# Patient Record
Sex: MALE | Race: BLACK OR AFRICAN AMERICAN | NOT HISPANIC OR LATINO | Employment: FULL TIME | URBAN - NONMETROPOLITAN AREA
[De-identification: names, ages, dates, MRNs, and addresses within clinical notes are randomized per-mention and may not be internally consistent; named-entity substitution may affect disease eponyms.]

---

## 2023-06-09 RX ORDER — METFORMIN HYDROCHLORIDE 1000 MG/1
TABLET ORAL
Qty: 60 TABLET | Refills: 1 | OUTPATIENT
Start: 2023-06-09

## 2023-08-04 DIAGNOSIS — M1A.9XX0 CHRONIC GOUT WITHOUT TOPHUS, UNSPECIFIED CAUSE, UNSPECIFIED SITE: ICD-10-CM

## 2023-08-04 RX ORDER — INDOMETHACIN 50 MG/1
50 CAPSULE ORAL 3 TIMES DAILY
COMMUNITY
End: 2023-08-04 | Stop reason: SDUPTHER

## 2023-08-07 RX ORDER — INDOMETHACIN 50 MG/1
50 CAPSULE ORAL 3 TIMES DAILY
Qty: 30 CAPSULE | Refills: 0 | Status: SHIPPED | OUTPATIENT
Start: 2023-08-07 | End: 2023-08-17

## 2023-11-10 ENCOUNTER — HOSPITAL ENCOUNTER (EMERGENCY)
Facility: HOSPITAL | Age: 53
Discharge: HOME | End: 2023-11-10
Payer: MEDICARE

## 2023-11-10 VITALS
HEART RATE: 87 BPM | WEIGHT: 315 LBS | TEMPERATURE: 99.2 F | HEIGHT: 72 IN | OXYGEN SATURATION: 97 % | BODY MASS INDEX: 42.66 KG/M2 | SYSTOLIC BLOOD PRESSURE: 169 MMHG | DIASTOLIC BLOOD PRESSURE: 122 MMHG | RESPIRATION RATE: 17 BRPM

## 2023-11-10 DIAGNOSIS — M10.9 GOUT OF MULTIPLE SITES, UNSPECIFIED CAUSE, UNSPECIFIED CHRONICITY: Primary | ICD-10-CM

## 2023-11-10 PROCEDURE — 99283 EMERGENCY DEPT VISIT LOW MDM: CPT

## 2023-11-10 PROCEDURE — 2500000001 HC RX 250 WO HCPCS SELF ADMINISTERED DRUGS (ALT 637 FOR MEDICARE OP): Performed by: PHYSICIAN ASSISTANT

## 2023-11-10 PROCEDURE — 99285 EMERGENCY DEPT VISIT HI MDM: CPT

## 2023-11-10 RX ORDER — INDOMETHACIN 25 MG/1
25 CAPSULE ORAL ONCE
Status: COMPLETED | OUTPATIENT
Start: 2023-11-10 | End: 2023-11-10

## 2023-11-10 RX ORDER — INDOMETHACIN 25 MG/1
25 CAPSULE ORAL 3 TIMES DAILY PRN
Qty: 30 CAPSULE | Refills: 0 | Status: SHIPPED | OUTPATIENT
Start: 2023-11-10 | End: 2023-11-20

## 2023-11-10 RX ADMIN — INDOMETHACIN 25 MG: 25 CAPSULE ORAL at 21:19

## 2023-11-10 ASSESSMENT — COLUMBIA-SUICIDE SEVERITY RATING SCALE - C-SSRS
2. HAVE YOU ACTUALLY HAD ANY THOUGHTS OF KILLING YOURSELF?: NO
1. IN THE PAST MONTH, HAVE YOU WISHED YOU WERE DEAD OR WISHED YOU COULD GO TO SLEEP AND NOT WAKE UP?: NO
6. HAVE YOU EVER DONE ANYTHING, STARTED TO DO ANYTHING, OR PREPARED TO DO ANYTHING TO END YOUR LIFE?: NO

## 2023-11-10 ASSESSMENT — PAIN SCALES - GENERAL: PAINLEVEL_OUTOF10: 8

## 2023-11-10 ASSESSMENT — PAIN - FUNCTIONAL ASSESSMENT: PAIN_FUNCTIONAL_ASSESSMENT: 0-10

## 2023-11-10 ASSESSMENT — PAIN DESCRIPTION - PAIN TYPE: TYPE: ACUTE PAIN

## 2023-11-10 ASSESSMENT — PAIN DESCRIPTION - LOCATION: LOCATION: WRIST

## 2023-11-11 NOTE — ED PROVIDER NOTES
HPI   Chief Complaint   Patient presents with    Joint Swelling     States is having a gout flare up in his left wrist. Requesting a medication called indomethacin        History of present illness:  53-year-old male presents to the emergency room for complaints of left wrist pain.  He states he has been having worsening pain in his left wrist for the past few days and he woke up with the pain a few mornings ago.  He states that he has a history of gout which she has had been dealing with for the past 15 to 20 years.  He states that he does not currently take allopurinol and stopped taking it years ago after was proving ineffective for preventing flare.  He states usually gets indomethacin over he has a flareup and states usually happens a few times a year.  He states that he has never been seen by rheumatology before but he does see his primary care doctor.  He states he takes medications for hypertension and hyperlipidemia.  He denies taking any thiazides or water pills and denies taking other medications.    Social history: Negative for alcohol and drug use.    Review of systems:   Gen.: No weight loss,  fever.   Eyes: No vision loss, double vision  ENT: No pharyngitis, neck pain  Cardiac: No chest pain, palpitations, syncope  Pulmonary: No shortness of breath, cough, hemoptysis.   Heme/lymph: No swollen glands, fever, bleeding.   GI: No abdominal pain, change in bowel habits, melena, hematemesis, hematochezia, nausea, vomiting, diarrhea.   : No discharge, dysuria, frequency, urgency, hematuria.   Musculoskeletal: No limb pain  Skin: No rashes.   Review of systems is otherwise negative unless stated above or in history of present illness.        Physical exam:  General: Vitals noted, no distress. Afebrile.   EENT: No lymphadenopathy   Cardiac: Regular, rate, rhythm, no murmur.   Pulmonary: Lungs clear bilaterally with good aeration. No adventitious breath sounds.   Abdomen: Soft, nonsurgical. Nontender. No  peritoneal signs. Normoactive bowel sounds.   Extremities: No peripheral edema.  The left wrist is with swelling is present over the dorsal aspect of the wrist extending onto the proximal portion of the hand, is warm and tender to touch there is full range of motion intact good cap refill and sensation all fingertips.  The right wrist is unremarkable  Skin: No rash.   Neuro: No focal neurologic deficits          Medical decision making:   Testing: Clinical exam  Treatment: Indomethacin p.o. given  Reevaluation:   Plan: Home-going.  Discussed differential. Will follow-up with rheumatology in the next 2-3 days. Return if worse. They understand return precautions and discharge instructions. Patient and family/friend/caregiver are in agreement with this plan. 53-year-old male presents to the emergency room for complaints of left wrist pain.  He states he has been having worsening pain in his left wrist for the past few days and he woke up with the pain a few mornings ago.  He states that he has a history of gout which she has had been dealing with for the past 15 to 20 years.  He states that he does not currently take allopurinol and stopped taking it years ago after was proving ineffective for preventing flare.  He states usually gets indomethacin over he has a flareup and states usually happens a few times a year.  He states that he has never been seen by rheumatology before but he does see his primary care doctor.  He states he takes medications for hypertension and hyperlipidemia.  He denies taking any thiazides or water pills and denies taking other medications. Extremities: No peripheral edema.  The left wrist is with swelling is present over the dorsal aspect of the wrist extending onto the proximal portion of the hand, is warm and tender to touch there is full range of motion intact good cap refill and sensation all fingertips.  The right wrist is unremarkable.  I explained to the patient be happy to send her  home with some the medicine at this time and he was agreeable this plan.  I encouraged him to follow-up with rheumatology as well given his history of recurrent gout flareups.    Impression:   1.  Gout            History provided by:  Patient   used: No                        Mila Coma Scale Score: 15                  Patient History   Past Medical History:   Diagnosis Date    Ganglion, unspecified site 09/24/2015    Ganglion cyst    Gout, unspecified 12/09/2013    Acute gout    Personal history of other diseases of the circulatory system 07/17/2017    History of uncontrolled hypertension    Personal history of other endocrine, nutritional and metabolic disease 07/21/2014    History of hyperglycemia     History reviewed. No pertinent surgical history.  No family history on file.  Social History     Tobacco Use    Smoking status: Never    Smokeless tobacco: Never   Vaping Use    Vaping Use: Never used   Substance Use Topics    Alcohol use: Never    Drug use: Never       Physical Exam   ED Triage Vitals [11/10/23 2024]   Temp Heart Rate Resp BP   37.3 °C (99.2 °F) 87 17 (!) 169/122      SpO2 Temp Source Heart Rate Source Patient Position   97 % Temporal Monitor --      BP Location FiO2 (%)     -- --       Physical Exam    ED Course & MDM   Diagnoses as of 11/10/23 2120   Gout of multiple sites, unspecified cause, unspecified chronicity       Medical Decision Making      Procedure  Procedures     Jann Plummer PA-C  11/10/23 2205

## 2023-12-05 ENCOUNTER — OFFICE VISIT (OUTPATIENT)
Dept: PRIMARY CARE | Facility: CLINIC | Age: 53
End: 2023-12-05
Payer: MEDICARE

## 2023-12-05 VITALS
HEART RATE: 93 BPM | DIASTOLIC BLOOD PRESSURE: 83 MMHG | WEIGHT: 315 LBS | HEIGHT: 72 IN | SYSTOLIC BLOOD PRESSURE: 130 MMHG | OXYGEN SATURATION: 93 % | BODY MASS INDEX: 42.66 KG/M2

## 2023-12-05 DIAGNOSIS — N18.32 TYPE 2 DIABETES MELLITUS WITH STAGE 3B CHRONIC KIDNEY DISEASE, WITHOUT LONG-TERM CURRENT USE OF INSULIN (MULTI): ICD-10-CM

## 2023-12-05 DIAGNOSIS — R10.32 GROIN PAIN, LEFT: ICD-10-CM

## 2023-12-05 DIAGNOSIS — M1A.09X0 CHRONIC GOUT OF MULTIPLE SITES, UNSPECIFIED CAUSE: Primary | ICD-10-CM

## 2023-12-05 DIAGNOSIS — I10 UNCONTROLLED HYPERTENSION: ICD-10-CM

## 2023-12-05 DIAGNOSIS — E11.22 TYPE 2 DIABETES MELLITUS WITH STAGE 3B CHRONIC KIDNEY DISEASE, WITHOUT LONG-TERM CURRENT USE OF INSULIN (MULTI): ICD-10-CM

## 2023-12-05 DIAGNOSIS — Z91.199 NON-COMPLIANCE: ICD-10-CM

## 2023-12-05 PROBLEM — E11.9 DIABETES MELLITUS (MULTI): Status: ACTIVE | Noted: 2023-12-05

## 2023-12-05 PROBLEM — R79.89 LOW VITAMIN D LEVEL: Status: ACTIVE | Noted: 2023-12-05

## 2023-12-05 PROBLEM — M1A.9XX0 CHRONIC GOUTY ARTHROPATHY: Status: ACTIVE | Noted: 2023-12-05

## 2023-12-05 PROBLEM — R06.83 SNORES: Status: ACTIVE | Noted: 2023-12-05

## 2023-12-05 PROBLEM — M1A.00X0 CHRONIC GOUTY ARTHROPATHY: Status: ACTIVE | Noted: 2023-12-05

## 2023-12-05 PROBLEM — G47.33 SEVERE OBSTRUCTIVE SLEEP APNEA: Status: ACTIVE | Noted: 2023-12-05

## 2023-12-05 PROBLEM — R80.9 PROTEINURIA: Status: ACTIVE | Noted: 2023-12-05

## 2023-12-05 PROBLEM — M79.18 MUSCULOSKELETAL PAIN: Status: ACTIVE | Noted: 2023-12-05

## 2023-12-05 PROBLEM — M10.9 GOUT: Status: ACTIVE | Noted: 2023-12-05

## 2023-12-05 LAB — POC HEMOGLOBIN A1C: 6 % (ref 4.2–6.5)

## 2023-12-05 PROCEDURE — 3079F DIAST BP 80-89 MM HG: CPT

## 2023-12-05 PROCEDURE — 99213 OFFICE O/P EST LOW 20 MIN: CPT

## 2023-12-05 PROCEDURE — 4010F ACE/ARB THERAPY RXD/TAKEN: CPT

## 2023-12-05 PROCEDURE — 3075F SYST BP GE 130 - 139MM HG: CPT

## 2023-12-05 PROCEDURE — 1036F TOBACCO NON-USER: CPT

## 2023-12-05 PROCEDURE — 83036 HEMOGLOBIN GLYCOSYLATED A1C: CPT

## 2023-12-05 RX ORDER — AMLODIPINE BESYLATE 10 MG/1
1 TABLET ORAL DAILY
COMMUNITY
Start: 2019-03-27 | End: 2023-12-05 | Stop reason: SDUPTHER

## 2023-12-05 RX ORDER — METHYLPREDNISOLONE 4 MG/1
TABLET ORAL
Qty: 21 TABLET | Refills: 0 | Status: SHIPPED | OUTPATIENT
Start: 2023-12-05 | End: 2023-12-12

## 2023-12-05 RX ORDER — INDOMETHACIN 50 MG/1
50 CAPSULE ORAL
Qty: 60 CAPSULE | Refills: 0 | Status: SHIPPED | OUTPATIENT
Start: 2023-12-05 | End: 2024-01-04

## 2023-12-05 RX ORDER — AMLODIPINE BESYLATE 10 MG/1
10 TABLET ORAL DAILY
Qty: 90 TABLET | Refills: 3 | Status: SHIPPED | OUTPATIENT
Start: 2023-12-05 | End: 2024-12-04

## 2023-12-05 RX ORDER — LOSARTAN POTASSIUM 100 MG/1
1 TABLET ORAL DAILY
COMMUNITY
Start: 2021-10-07 | End: 2023-12-05 | Stop reason: SDUPTHER

## 2023-12-05 RX ORDER — LOSARTAN POTASSIUM 100 MG/1
100 TABLET ORAL DAILY
Qty: 90 TABLET | Refills: 3 | Status: SHIPPED | OUTPATIENT
Start: 2023-12-05 | End: 2024-12-04

## 2023-12-05 ASSESSMENT — ENCOUNTER SYMPTOMS
FATIGUE: 0
NECK PAIN: 0
ACTIVITY CHANGE: 0
DIFFICULTY URINATING: 0
WEAKNESS: 1
NAUSEA: 0
NECK STIFFNESS: 0
RECTAL PAIN: 0
FREQUENCY: 0
HYPERTENSION: 1
CONSTIPATION: 0
ABDOMINAL DISTENTION: 0
VOMITING: 0
APPETITE CHANGE: 0
FEVER: 0
DIARRHEA: 0
UNEXPECTED WEIGHT CHANGE: 0
PAIN: 1
MYALGIAS: 1
FLANK PAIN: 0
PALPITATIONS: 0
ABDOMINAL PAIN: 0

## 2023-12-05 ASSESSMENT — PATIENT HEALTH QUESTIONNAIRE - PHQ9
SUM OF ALL RESPONSES TO PHQ9 QUESTIONS 1 AND 2: 0
2. FEELING DOWN, DEPRESSED OR HOPELESS: NOT AT ALL
1. LITTLE INTEREST OR PLEASURE IN DOING THINGS: NOT AT ALL

## 2023-12-05 NOTE — PROGRESS NOTES
Subjective   Patient ID: Gabriel Davis is a 53 y.o. male who presents for Gout (Problems with gout and flaring up, injury in left lower abdominal pain.).    Gout, pain right big toe, left hip, bilateral hands, similar to flares in the past. Has tried allopurinol     Diabetes  He presents for his follow-up diabetic visit. He has type 2 diabetes mellitus. No MedicAlert identification noted. The initial diagnosis of diabetes was made 8 years ago. His disease course has been fluctuating. There are no hypoglycemic associated symptoms. Associated symptoms include foot paresthesias and weakness. Pertinent negatives for diabetes include no fatigue. There are no hypoglycemic complications. Risk factors for coronary artery disease include obesity, male sex, stress and diabetes mellitus. When asked about current treatments, none were reported.   Pain  This is a new problem. The current episode started more than 1 month ago. The problem occurs daily. The problem is unchanged. The pain occurs in the context of an injury (was squatting at home trying to exercise). Pain location: left groin, radiates to hip and knee. The pain is severe. The symptoms are aggravated by any movement. Associated symptoms include weakness. Pertinent negatives include no abdominal pain, constipation, diarrhea, fatigue, fever, nausea or vomiting. (Gout flare  ) Past treatments include rest and heat pack. The treatment provided no relief.   Hypertension  This is a chronic problem. The current episode started more than 1 year ago. The problem has been gradually improving since onset. The problem is uncontrolled. Associated symptoms include malaise/fatigue and peripheral edema. Pertinent negatives include no neck pain or palpitations. There are no associated agents to hypertension. Risk factors for coronary artery disease include diabetes mellitus, obesity and male gender. Past treatments include lifestyle changes, calcium channel blockers and angiotensin  blockers. The current treatment provides significant improvement. Compliance problems include exercise and diet.         Review of Systems   Constitutional:  Positive for malaise/fatigue. Negative for activity change, appetite change, fatigue, fever and unexpected weight change.   Cardiovascular:  Negative for palpitations.   Gastrointestinal:  Negative for abdominal distention, abdominal pain, constipation, diarrhea, nausea, rectal pain and vomiting.   Genitourinary:  Negative for difficulty urinating, flank pain, frequency, scrotal swelling and testicular pain.   Musculoskeletal:  Positive for gait problem and myalgias. Negative for neck pain and neck stiffness.   Neurological:  Positive for weakness.       Objective   /83   Pulse 93   Ht 1.829 m (6')   Wt (!) 150 kg (331 lb)   SpO2 93%   BMI 44.89 kg/m²     Physical Exam  Vitals and nursing note reviewed.   Constitutional:       General: He is not in acute distress.     Appearance: Normal appearance. He is obese. He is not ill-appearing.   Cardiovascular:      Heart sounds: Normal heart sounds.   Pulmonary:      Effort: Pulmonary effort is normal.      Breath sounds: Normal breath sounds.   Abdominal:      General: Bowel sounds are normal.      Tenderness: There is no guarding or rebound.   Neurological:      Mental Status: He is alert.         Assessment/Plan   Problem List Items Addressed This Visit             ICD-10-CM    Diabetes mellitus (CMS/[facility]) E11.9    Relevant Orders    POCT glycosylated hemoglobin (Hb A1C) manually resulted (Completed)    Gout - Primary M10.9    Relevant Medications    indomethacin (Indocin) 50 mg capsule    methylPREDNISolone (Medrol Dospak) 4 mg tablets    Non-compliance Z91.199    Uncontrolled hypertension I10    Relevant Medications    losartan (Cozaar) 100 mg tablet    amLODIPine (Norvasc) 10 mg tablet     Other Visit Diagnoses         Codes    Groin pain, left     R10.32    Relevant Orders    US pelvis    Referral to  Physical Therapy

## 2023-12-11 ENCOUNTER — ANCILLARY PROCEDURE (OUTPATIENT)
Dept: RADIOLOGY | Facility: CLINIC | Age: 53
End: 2023-12-11
Payer: MEDICARE

## 2023-12-11 DIAGNOSIS — R10.32 GROIN PAIN, LEFT: ICD-10-CM

## 2023-12-11 PROCEDURE — 76857 US EXAM PELVIC LIMITED: CPT

## 2023-12-11 PROCEDURE — 76857 US EXAM PELVIC LIMITED: CPT | Performed by: RADIOLOGY

## 2024-01-26 DIAGNOSIS — M1A.9XX0 CHRONIC GOUT WITHOUT TOPHUS, UNSPECIFIED CAUSE, UNSPECIFIED SITE: ICD-10-CM

## 2024-01-26 RX ORDER — INDOMETHACIN 50 MG/1
50 CAPSULE ORAL 3 TIMES DAILY
COMMUNITY
Start: 2023-10-12 | End: 2024-01-26 | Stop reason: SDUPTHER

## 2024-01-28 DIAGNOSIS — M1A.09X0 CHRONIC GOUT OF MULTIPLE SITES, UNSPECIFIED CAUSE: ICD-10-CM

## 2024-01-28 DIAGNOSIS — E66.01 CLASS 3 SEVERE OBESITY DUE TO EXCESS CALORIES WITH SERIOUS COMORBIDITY AND BODY MASS INDEX (BMI) OF 40.0 TO 44.9 IN ADULT (MULTI): ICD-10-CM

## 2024-01-28 DIAGNOSIS — R79.89 LOW VITAMIN D LEVEL: ICD-10-CM

## 2024-01-28 DIAGNOSIS — E11.22 TYPE 2 DIABETES MELLITUS WITH STAGE 3B CHRONIC KIDNEY DISEASE, WITHOUT LONG-TERM CURRENT USE OF INSULIN (MULTI): ICD-10-CM

## 2024-01-28 DIAGNOSIS — I10 UNCONTROLLED HYPERTENSION: ICD-10-CM

## 2024-01-28 DIAGNOSIS — N18.32 TYPE 2 DIABETES MELLITUS WITH STAGE 3B CHRONIC KIDNEY DISEASE, WITHOUT LONG-TERM CURRENT USE OF INSULIN (MULTI): ICD-10-CM

## 2024-01-28 DIAGNOSIS — N18.30 STAGE 3 CHRONIC KIDNEY DISEASE, UNSPECIFIED WHETHER STAGE 3A OR 3B CKD (MULTI): Primary | ICD-10-CM

## 2024-01-30 RX ORDER — INDOMETHACIN 50 MG/1
50 CAPSULE ORAL
Qty: 270 CAPSULE | Refills: 3 | Status: SHIPPED | OUTPATIENT
Start: 2024-01-30

## 2024-02-05 ENCOUNTER — OFFICE VISIT (OUTPATIENT)
Dept: PRIMARY CARE | Facility: CLINIC | Age: 54
End: 2024-02-05
Payer: MEDICARE

## 2024-02-05 ENCOUNTER — LAB (OUTPATIENT)
Dept: LAB | Facility: LAB | Age: 54
End: 2024-02-05
Payer: MEDICARE

## 2024-02-05 VITALS
DIASTOLIC BLOOD PRESSURE: 118 MMHG | BODY MASS INDEX: 42.66 KG/M2 | OXYGEN SATURATION: 98 % | WEIGHT: 315 LBS | SYSTOLIC BLOOD PRESSURE: 214 MMHG | HEART RATE: 75 BPM | HEIGHT: 72 IN

## 2024-02-05 DIAGNOSIS — G47.33 SEVERE OBSTRUCTIVE SLEEP APNEA: ICD-10-CM

## 2024-02-05 DIAGNOSIS — N18.30 STAGE 3 CHRONIC KIDNEY DISEASE, UNSPECIFIED WHETHER STAGE 3A OR 3B CKD (MULTI): ICD-10-CM

## 2024-02-05 DIAGNOSIS — G89.29 CHRONIC GROIN PAIN, LEFT: ICD-10-CM

## 2024-02-05 DIAGNOSIS — I10 UNCONTROLLED HYPERTENSION: ICD-10-CM

## 2024-02-05 DIAGNOSIS — R79.89 LOW VITAMIN D LEVEL: ICD-10-CM

## 2024-02-05 DIAGNOSIS — E11.22 TYPE 2 DIABETES MELLITUS WITH STAGE 3B CHRONIC KIDNEY DISEASE, WITHOUT LONG-TERM CURRENT USE OF INSULIN (MULTI): ICD-10-CM

## 2024-02-05 DIAGNOSIS — N18.32 TYPE 2 DIABETES MELLITUS WITH STAGE 3B CHRONIC KIDNEY DISEASE, WITHOUT LONG-TERM CURRENT USE OF INSULIN (MULTI): ICD-10-CM

## 2024-02-05 DIAGNOSIS — Z00.00 PREVENTATIVE HEALTH CARE: Primary | ICD-10-CM

## 2024-02-05 DIAGNOSIS — I16.0 ASYMPTOMATIC HYPERTENSIVE URGENCY: ICD-10-CM

## 2024-02-05 DIAGNOSIS — E11.9 NON-INSULIN DEPENDENT TYPE 2 DIABETES MELLITUS (MULTI): ICD-10-CM

## 2024-02-05 DIAGNOSIS — M79.605 LEFT LEG PAIN: ICD-10-CM

## 2024-02-05 DIAGNOSIS — M1A.00X0 CHRONIC GOUTY ARTHROPATHY: ICD-10-CM

## 2024-02-05 DIAGNOSIS — M1A.09X0 CHRONIC GOUT OF MULTIPLE SITES, UNSPECIFIED CAUSE: ICD-10-CM

## 2024-02-05 DIAGNOSIS — R10.32 CHRONIC GROIN PAIN, LEFT: ICD-10-CM

## 2024-02-05 DIAGNOSIS — M79.89 SWELLING OF LEFT LOWER EXTREMITY: ICD-10-CM

## 2024-02-05 LAB
25(OH)D3 SERPL-MCNC: 38 NG/ML (ref 30–100)
ALBUMIN SERPL BCP-MCNC: 4.1 G/DL (ref 3.4–5)
ALP SERPL-CCNC: 85 U/L (ref 33–120)
ALT SERPL W P-5'-P-CCNC: 9 U/L (ref 10–52)
ANION GAP SERPL CALC-SCNC: 18 MMOL/L (ref 10–20)
AST SERPL W P-5'-P-CCNC: 11 U/L (ref 9–39)
BASOPHILS # BLD AUTO: 0.04 X10*3/UL (ref 0–0.1)
BASOPHILS NFR BLD AUTO: 0.7 %
BILIRUB SERPL-MCNC: 0.6 MG/DL (ref 0–1.2)
BUN SERPL-MCNC: 18 MG/DL (ref 6–23)
CALCIUM SERPL-MCNC: 9.4 MG/DL (ref 8.6–10.3)
CHLORIDE SERPL-SCNC: 104 MMOL/L (ref 98–107)
CHOLEST SERPL-MCNC: 150 MG/DL (ref 0–199)
CHOLESTEROL/HDL RATIO: 3.9
CO2 SERPL-SCNC: 26 MMOL/L (ref 21–32)
CREAT SERPL-MCNC: 1.3 MG/DL (ref 0.5–1.3)
CREAT UR-MCNC: 87.4 MG/DL (ref 20–370)
EGFRCR SERPLBLD CKD-EPI 2021: 66 ML/MIN/1.73M*2
EOSINOPHIL # BLD AUTO: 0.1 X10*3/UL (ref 0–0.7)
EOSINOPHIL NFR BLD AUTO: 1.6 %
ERYTHROCYTE [DISTWIDTH] IN BLOOD BY AUTOMATED COUNT: 15.7 % (ref 11.5–14.5)
GLUCOSE SERPL-MCNC: 142 MG/DL (ref 74–99)
HCT VFR BLD AUTO: 39.4 % (ref 41–52)
HDLC SERPL-MCNC: 38.8 MG/DL
HGB BLD-MCNC: 12.1 G/DL (ref 13.5–17.5)
IMM GRANULOCYTES # BLD AUTO: 0.01 X10*3/UL (ref 0–0.7)
IMM GRANULOCYTES NFR BLD AUTO: 0.2 % (ref 0–0.9)
LDLC SERPL CALC-MCNC: 97 MG/DL
LYMPHOCYTES # BLD AUTO: 1.46 X10*3/UL (ref 1.2–4.8)
LYMPHOCYTES NFR BLD AUTO: 23.8 %
MCH RBC QN AUTO: 22 PG (ref 26–34)
MCHC RBC AUTO-ENTMCNC: 30.7 G/DL (ref 32–36)
MCV RBC AUTO: 72 FL (ref 80–100)
MICROALBUMIN UR-MCNC: 8.6 MG/L
MICROALBUMIN/CREAT UR: 9.8 UG/MG CREAT
MONOCYTES # BLD AUTO: 0.37 X10*3/UL (ref 0.1–1)
MONOCYTES NFR BLD AUTO: 6 %
NEUTROPHILS # BLD AUTO: 4.16 X10*3/UL (ref 1.2–7.7)
NEUTROPHILS NFR BLD AUTO: 67.7 %
NON HDL CHOLESTEROL: 111 MG/DL (ref 0–149)
NRBC BLD-RTO: 0 /100 WBCS (ref 0–0)
PLATELET # BLD AUTO: 231 X10*3/UL (ref 150–450)
POTASSIUM SERPL-SCNC: 4.2 MMOL/L (ref 3.5–5.3)
PROT SERPL-MCNC: 7.2 G/DL (ref 6.4–8.2)
RBC # BLD AUTO: 5.49 X10*6/UL (ref 4.5–5.9)
SODIUM SERPL-SCNC: 144 MMOL/L (ref 136–145)
TRIGL SERPL-MCNC: 73 MG/DL (ref 0–149)
TSH SERPL-ACNC: 3.65 MIU/L (ref 0.44–3.98)
URATE SERPL-MCNC: 8.5 MG/DL (ref 4–7.5)
VLDL: 15 MG/DL (ref 0–40)
WBC # BLD AUTO: 6.1 X10*3/UL (ref 4.4–11.3)

## 2024-02-05 PROCEDURE — 82043 UR ALBUMIN QUANTITATIVE: CPT

## 2024-02-05 PROCEDURE — 36415 COLL VENOUS BLD VENIPUNCTURE: CPT

## 2024-02-05 PROCEDURE — 99214 OFFICE O/P EST MOD 30 MIN: CPT | Performed by: FAMILY MEDICINE

## 2024-02-05 PROCEDURE — 1036F TOBACCO NON-USER: CPT | Performed by: FAMILY MEDICINE

## 2024-02-05 PROCEDURE — 82570 ASSAY OF URINE CREATININE: CPT

## 2024-02-05 PROCEDURE — 3080F DIAST BP >= 90 MM HG: CPT | Performed by: FAMILY MEDICINE

## 2024-02-05 PROCEDURE — 84443 ASSAY THYROID STIM HORMONE: CPT

## 2024-02-05 PROCEDURE — 84550 ASSAY OF BLOOD/URIC ACID: CPT

## 2024-02-05 PROCEDURE — 3077F SYST BP >= 140 MM HG: CPT | Performed by: FAMILY MEDICINE

## 2024-02-05 PROCEDURE — 99396 PREV VISIT EST AGE 40-64: CPT | Performed by: FAMILY MEDICINE

## 2024-02-05 PROCEDURE — 82306 VITAMIN D 25 HYDROXY: CPT

## 2024-02-05 PROCEDURE — 85025 COMPLETE CBC W/AUTO DIFF WBC: CPT

## 2024-02-05 PROCEDURE — 80053 COMPREHEN METABOLIC PANEL: CPT

## 2024-02-05 PROCEDURE — 3008F BODY MASS INDEX DOCD: CPT | Performed by: FAMILY MEDICINE

## 2024-02-05 PROCEDURE — 4010F ACE/ARB THERAPY RXD/TAKEN: CPT | Performed by: FAMILY MEDICINE

## 2024-02-05 PROCEDURE — 80061 LIPID PANEL: CPT

## 2024-02-05 RX ORDER — SPIRONOLACTONE 50 MG/1
50 TABLET, FILM COATED ORAL DAILY
Qty: 90 TABLET | Refills: 3 | Status: SHIPPED | OUTPATIENT
Start: 2024-02-05 | End: 2025-02-04

## 2024-02-05 ASSESSMENT — COLUMBIA-SUICIDE SEVERITY RATING SCALE - C-SSRS
6. HAVE YOU EVER DONE ANYTHING, STARTED TO DO ANYTHING, OR PREPARED TO DO ANYTHING TO END YOUR LIFE?: NO
1. IN THE PAST MONTH, HAVE YOU WISHED YOU WERE DEAD OR WISHED YOU COULD GO TO SLEEP AND NOT WAKE UP?: NO
2. HAVE YOU ACTUALLY HAD ANY THOUGHTS OF KILLING YOURSELF?: NO

## 2024-02-05 ASSESSMENT — PATIENT HEALTH QUESTIONNAIRE - PHQ9
1. LITTLE INTEREST OR PLEASURE IN DOING THINGS: NOT AT ALL
SUM OF ALL RESPONSES TO PHQ9 QUESTIONS 1 AND 2: 0
2. FEELING DOWN, DEPRESSED OR HOPELESS: NOT AT ALL

## 2024-02-05 ASSESSMENT — ENCOUNTER SYMPTOMS
OCCASIONAL FEELINGS OF UNSTEADINESS: 0
LOSS OF SENSATION IN FEET: 0
DEPRESSION: 0

## 2024-02-05 NOTE — PROGRESS NOTES
Subjective   Patient is a 53 y.o. male presents for yearly physical examination.     Previous medication list on 2/6/23: Ozeomic 0.5 mg weekly, Indomethacin 50 mg prn , amlodipine 10 mg , losartan 100 mg daily, spironolactone 25 mg daily, albuterol, night time O2  Current Meds: amlodipine 10 mg , indomethacin 50 mg , losartan 100 mg     #) COVID shot and feet are now hurting - stiffness      #) gout- last flare in the Fall 2023  - diuretic seem to trigger a flare   - clavile, wrist and ankle pains      #) T2DM - stable   - sugars have been low  - A1c 8.8% on Oct 7 2021--> 6.5% 4/11/22---> 5.7% today 2/6/23 --> 6.4% today   - was put on insulin in the hospital with covid- off insulin now  - on metformin, and glipizide--> started on ozempic  - weight down 7# , and another 33# --> another 23#     #) HTN - UNCONTROLLED   -- ho,e readings are around 150/80s  - 142/88 --> 132/80--> 148/80 --> 170/102--> 184/118 --> 214/118 today   - on amlodipine, lostartan  also on blood thinner prophylaxis, will run out in 2 weeks.      #) severe sleep apnea. - not on treatment   - referral to sleep med  - rec continued home O2 at night      wt down from 376 (2021)--> 309 --> 333    Review of system are negative unless otherwise stated in the HPI.     Objective     BP (!) 214/118   Pulse 75   Ht 1.829 m (6')   Wt (!) 151 kg (333 lb)   SpO2 98%   BMI 45.16 kg/m²     Physical Examination  GEN: A+O, no acute distress  HEENT: NC/AT, Oropharynx clear, no exudates, TM visualized, Extraoccular muscles intact, no facial droop; no thyromegaly or cervical LAD  RESP: CTAB, no wheezes   CV: RRR, no murmurs  ABD: soft, non-tender, + BS  SKIN: no rashes or bruising, no peripheral edema   NEURO: CN II-XII grossly intact, moves all extremities equally, no tremor   PSYCH: normal affect, appropriate mood     Assessment/Plan     Active Problems:  There are no active Hospital Problems.  Myrtle   Stoneedwin Davis is a 53 y.o. male who presents for  Annual Exam (Bp is high feeling under the weather, grown in hurting him).    HPI    ROS was completed and all systems are negative with the exception of what was noted in the the HPI.     Objective     BP (!) 214/118   Pulse 75   Ht 1.829 m (6')   Wt (!) 151 kg (333 lb)   SpO2 98%   BMI 45.16 kg/m²      Physical Exam  GEN: A+O, no acute distress, obese male  HEENT: NC/AT, Oropharynx clear, no exudates, TM visualized, Extraoccular muscles intact, no facial droop; no thyromegaly or cervical LAD  RESP: CTAB, no wheezes   CV: RRR, no murmurs  ABD: soft, non-tender, + BS  SKIN: no rashes or bruising, no peripheral edema   NEURO: CN II-XII grossly intact, moves all extremities equally, no tremor   PSYCH: normal affect, appropriate mood     Assessment/Plan   Problem List Items Addressed This Visit    None    Fasting labs are due now if you could go down today to get this done now.      CT of the chest in 1/17/24- showed some scarring ( likely from the bad covid infection), so some dilation of the arteries to the lung from untreated sleep apnea and the scarring of the lungs.     Order for a CT of the abdomen and pelvic to looks for abdominal restrictions / clots, hernias.     We should eventually get an ultrasound of the heart to check for the squeeze function.     Please try to reach out to patient assistance for the bills.     Referral to nurse practitioner Juana to help with treatment of the sleep apnea.      Blood pressure is WAY TOO HIGH today. Continue the amlodipine and losartan  ADD back in the spironolactone for swelling and blood pressure      Please continue to check blood pressure eat home, goal is less than 130/80     weight is back up 30#.      A1c today was 6.4%, goal is to keep it under 7%. This is up 0.4% since December 2023, please restart the ozempic, samples given today   Restart at the 0.25 mg WEEKLY skin shot.   Order for the      Please set up a consultation for new gout medications that might  help     please consider a podiatry consultation for diabetes. Also get a yearly dilated eye examination and monitor/check feet daily for injuries.      Referral to our clinical pharmacist to help with coverage and medication cost.     Please follow up in 1 months for A1c recheck , weight recheck and BP recheck.        Nia Noriega DO, MSMed, ABOM  [address]   [address]   [place] [address]  Ph. [phone]  Fx. [phone]

## 2024-02-05 NOTE — PATIENT INSTRUCTIONS
Fasting labs are due now if you could go down today to get this done now.      CT of the chest in 1/17/24- showed some scarring ( likely from the bad covid infection), so some dilation of the arteries to the lung from untreated sleep apnea and the scarring of the lungs.     Order for a CT of the abdomen and pelvic to looks for abdominal restrictions / clots, hernias.     We should eventually get an ultrasound of the heart to check for the squeeze function.     Please try to reach out to patient assistance for the bills.     Referral to nurse practitioner Juana to help with treatment of the sleep apnea.      Blood pressure is WAY TOO HIGH today. Continue the amlodipine and losartan  ADD back in the spironolactone for swelling and blood pressure      Please continue to check blood pressure eat home, goal is less than 130/80     weight is back up 30#.      A1c today was 6.4%, goal is to keep it under 7%. This is up 0.4% since December 2023, please restart the ozempic, samples given today   Restart at the 0.25 mg WEEKLY skin shot.   Order for the      Please set up a consultation for new gout medications that might help     please consider a podiatry consultation for diabetes. Also get a yearly dilated eye examination and monitor/check feet daily for injuries.      Referral to our clinical pharmacist to help with coverage and medication cost.     Please follow up in 1 months for A1c recheck , weight recheck and BP recheck.

## 2024-03-11 ENCOUNTER — APPOINTMENT (OUTPATIENT)
Dept: PRIMARY CARE | Facility: CLINIC | Age: 54
End: 2024-03-11
Payer: MEDICARE

## 2024-03-13 ENCOUNTER — APPOINTMENT (OUTPATIENT)
Dept: RADIOLOGY | Facility: HOSPITAL | Age: 54
End: 2024-03-13
Payer: MEDICARE

## 2024-03-26 ENCOUNTER — APPOINTMENT (OUTPATIENT)
Dept: RADIOLOGY | Facility: HOSPITAL | Age: 54
End: 2024-03-26
Payer: MEDICARE

## 2024-04-01 ENCOUNTER — OFFICE VISIT (OUTPATIENT)
Dept: PRIMARY CARE | Facility: CLINIC | Age: 54
End: 2024-04-01
Payer: MEDICARE

## 2024-04-01 VITALS
BODY MASS INDEX: 42.66 KG/M2 | HEIGHT: 72 IN | OXYGEN SATURATION: 96 % | HEART RATE: 92 BPM | SYSTOLIC BLOOD PRESSURE: 162 MMHG | DIASTOLIC BLOOD PRESSURE: 90 MMHG | WEIGHT: 315 LBS

## 2024-04-01 DIAGNOSIS — M79.89 SYMPTOM OF LEG SWELLING: ICD-10-CM

## 2024-04-01 DIAGNOSIS — N18.32 TYPE 2 DIABETES MELLITUS WITH STAGE 3B CHRONIC KIDNEY DISEASE, WITHOUT LONG-TERM CURRENT USE OF INSULIN (MULTI): ICD-10-CM

## 2024-04-01 DIAGNOSIS — E11.22 TYPE 2 DIABETES MELLITUS WITH STAGE 3B CHRONIC KIDNEY DISEASE, WITHOUT LONG-TERM CURRENT USE OF INSULIN (MULTI): ICD-10-CM

## 2024-04-01 DIAGNOSIS — R10.32 LEFT GROIN PAIN: Primary | ICD-10-CM

## 2024-04-01 DIAGNOSIS — I10 UNCONTROLLED HYPERTENSION: ICD-10-CM

## 2024-04-01 LAB — POC HEMOGLOBIN A1C: 6.3 % (ref 4.2–6.5)

## 2024-04-01 PROCEDURE — 3077F SYST BP >= 140 MM HG: CPT | Performed by: FAMILY MEDICINE

## 2024-04-01 PROCEDURE — 83036 HEMOGLOBIN GLYCOSYLATED A1C: CPT | Performed by: FAMILY MEDICINE

## 2024-04-01 PROCEDURE — 3008F BODY MASS INDEX DOCD: CPT | Performed by: FAMILY MEDICINE

## 2024-04-01 PROCEDURE — 1036F TOBACCO NON-USER: CPT | Performed by: FAMILY MEDICINE

## 2024-04-01 PROCEDURE — 3048F LDL-C <100 MG/DL: CPT | Performed by: FAMILY MEDICINE

## 2024-04-01 PROCEDURE — 3061F NEG MICROALBUMINURIA REV: CPT | Performed by: FAMILY MEDICINE

## 2024-04-01 PROCEDURE — 4010F ACE/ARB THERAPY RXD/TAKEN: CPT | Performed by: FAMILY MEDICINE

## 2024-04-01 PROCEDURE — 3080F DIAST BP >= 90 MM HG: CPT | Performed by: FAMILY MEDICINE

## 2024-04-01 PROCEDURE — 99214 OFFICE O/P EST MOD 30 MIN: CPT | Performed by: FAMILY MEDICINE

## 2024-04-01 RX ORDER — METOPROLOL SUCCINATE 50 MG/1
50 TABLET, EXTENDED RELEASE ORAL DAILY
Qty: 90 TABLET | Refills: 3 | Status: SHIPPED | OUTPATIENT
Start: 2024-04-01 | End: 2025-04-01

## 2024-04-01 ASSESSMENT — PATIENT HEALTH QUESTIONNAIRE - PHQ9
1. LITTLE INTEREST OR PLEASURE IN DOING THINGS: NOT AT ALL
2. FEELING DOWN, DEPRESSED OR HOPELESS: NOT AT ALL
SUM OF ALL RESPONSES TO PHQ9 QUESTIONS 1 AND 2: 0

## 2024-04-01 ASSESSMENT — ENCOUNTER SYMPTOMS
LOSS OF SENSATION IN FEET: 0
OCCASIONAL FEELINGS OF UNSTEADINESS: 0
DEPRESSION: 0

## 2024-04-01 ASSESSMENT — COLUMBIA-SUICIDE SEVERITY RATING SCALE - C-SSRS
1. IN THE PAST MONTH, HAVE YOU WISHED YOU WERE DEAD OR WISHED YOU COULD GO TO SLEEP AND NOT WAKE UP?: NO
2. HAVE YOU ACTUALLY HAD ANY THOUGHTS OF KILLING YOURSELF?: NO
6. HAVE YOU EVER DONE ANYTHING, STARTED TO DO ANYTHING, OR PREPARED TO DO ANYTHING TO END YOUR LIFE?: NO

## 2024-04-01 NOTE — PATIENT INSTRUCTIONS
Labs on 2/5/24 - We knew from the A1c in the office that the sugars have been higher than in the past, up to 6.4% from the 5.7% previously. Kidney function looks ok, normal liver function, Uric acid levels are still elevated at 8.5, this is putting you at high risk of a gout attack.  Blood counts look better than previous but still low hemoglobin , suspicious of low iron. Vitamin D has improved from 18 to 38, goal is to get this up to around 60 so please keep taking the vitamin D supplement. Cholesterol looks about the same, I am happy the LDL is less than 100 at 97.  Normal thyroid.      CT of the chest in 1/17/24- showed some scarring (likely from the bad covid infection), so some dilation of the arteries to the lung from untreated sleep apnea and the scarring of the lungs.     Order for a CT of the abdomen and pelvic to looks for abdominal restrictions / clots, hernias for the bilateral leg swelling - insurance denies first order for this.  We did a better job documenting this time, I still feel like we need this.     We should eventually get an ultrasound of the heart to check for the squeeze function.     Please try to reach out to patient assistance for the bills.     Referral to nurse practitioner Juana to help with treatment of the sleep apnea.      Blood pressure is WAY TOO HIGH today. Continue the amlodipine and losartan  START the metoproolol 50 mg daily   STOP the spironolactone cannon to nausea   Rechecked today was 172/95 with large cuff, still too small.      Please continue to check blood pressure eat home, goal is less than 130/80   Call in 1 WEEK with home readings.     weight is back up 30#. And up another 6#      A1c today was 6.3%, goal is to keep it under 7%. Please increase the ozempic, samples given today   Restart at the 0.25 mg WEEKLY skin shot.   Bring your machine with your to next appt.      Please set up a consultation for new gout medications that might help     please consider a  podiatry consultation for diabetes. Also get a yearly dilated eye examination and monitor/check feet daily for injuries.      Referral to our clinical pharmacist to help with coverage and medication cost.     Please follow up in 1 months for weight recheck and BP recheck.

## 2024-04-01 NOTE — PROGRESS NOTES
Subjective   Patient is a 53 y.o. male presents for yearly physical examination.     Previous medication list on 2/6/23: Ozeomic 0.5 mg weekly, Indomethacin 50 mg prn , amlodipine 10 mg , losartan 100 mg daily, spironolactone 25 mg daily, albuterol, night time O2  Current Meds: amlodipine 10 mg , indomethacin 50 mg , losartan 100 mg      #) gout- last flare in the Fall 2023  - diuretic seem to trigger a flare   - clavile, wrist and ankle pains      #) T2DM - stable   - sugars have been low  - A1c 8.8% on Oct 7 2021--> 6.5% 4/11/22---> 5.7%  2/6/23 --> 6.4% --> 6.0% 12/5/23   - was put on insulin in the hospital with covid- off insulin now  - on metformin, and glipizide--> started on ozempic  - weight down 7# , and another 33# --> another 23#,, then stopped taking it and regained      #) HTN - UNCONTROLLED   -- ho,e readings are around 150/80s  - 142/88 --> 132/80--> 148/80 --> 170/102--> 184/118 --> 214/118 --> 178/102 today   - on amlodipine, losartan  - started the spironolactone  - made him nauseated   also on blood thinner prophylaxis, will run out in 2 weeks.      #) severe sleep apnea. - not on treatment   - referral to sleep med  - rec continued home O2 at night     #) bilateral lege swelling and pain   - DVT was ruled out   - still having the left groin pain   - still very fatigued  - this is limited ability to stay active   - needs abdominal imaging to rule out obstructive      wt down from 376 (2021)--> 309 --> 333 --> 339       Review of system are negative unless otherwise stated in the HPI.     Objective     BP (!) 178/102   Pulse 92   Ht 1.829 m (6')   Wt (!) 154 kg (339 lb)   SpO2 96%   BMI 45.98 kg/m²     Physical Examination  GEN: A+O, no acute distress  HEENT: NC/AT, Oropharynx clear, no exudates, TM visualized, Extraoccular muscles intact, no facial droop; no thyromegaly or cervical LAD  RESP: CTAB, no wheezes   CV: RRR, no murmurs  ABD: soft, non-tender, + BS  SKIN: no rashes or bruising, no  peripheral edema   NEURO: CN II-XII grossly intact, moves all extremities equally, no tremor   PSYCH: normal affect, appropriate mood     Assessment/Plan     Active Problems:  There are no active Hospital Problems.  Myrtle Davis is a 53 y.o. male who presents for Follow-up (Bp follow up A1c check ).    HPI    ROS was completed and all systems are negative with the exception of what was noted in the the HPI.     Objective     BP (!) 178/102   Pulse 92   Ht 1.829 m (6')   Wt (!) 154 kg (339 lb)   SpO2 96%   BMI 45.98 kg/m²      Physical Exam  GEN: A+O, no acute distress, obese male  HEENT: NC/AT, Oropharynx clear, no exudates, TM visualized, Extraoccular muscles intact, no facial droop; no thyromegaly or cervical LAD  RESP: CTAB, no wheezes   CV: RRR, no murmurs  ABD: soft, non-tender, + BS  SKIN: no rashes or bruising, no peripheral edema   NEURO: CN II-XII grossly intact, moves all extremities equally, no tremor   PSYCH: normal affect, appropriate mood     Assessment/Plan   Problem List Items Addressed This Visit    None    \Labs on 2/5/24 - We knew from the A1c in the office that the sugars have been higher than in the past, up to 6.4% from the 5.7% previously. Kidney function looks ok, normal liver function, Uric acid levels are still elevated at 8.5, this is putting you at high risk of a gout attack.  Blood counts look better than previous but still low hemoglobin , suspicious of low iron. Vitamin D has improved from 18 to 38, goal is to get this up to around 60 so please keep taking the vitamin D supplement. Cholesterol looks about the same, I am happy the LDL is less than 100 at 97.  Normal thyroid.      CT of the chest in 1/17/24- showed some scarring (likely from the bad covid infection), so some dilation of the arteries to the lung from untreated sleep apnea and the scarring of the lungs.     Order for a CT of the abdomen and pelvic to looks for abdominal restrictions / clots, hernias for  the bilateral leg swelling - insurance denies first order for this.  We did a better job documenting this time, I still feel like we need this.     We should eventually get an ultrasound of the heart to check for the squeeze function.     Please try to reach out to patient assistance for the bills.     Referral to nurse practitioner Juana to help with treatment of the sleep apnea.      Blood pressure is WAY TOO HIGH today. Continue the amlodipine and losartan  START the metoproolol 50 mg daily   STOP the spironolactone cannon to nausea      Please continue to check blood pressure eat home, goal is less than 130/80   Call in 1 WEEK with home readings.     weight is back up 30#. And up another 6#      A1c today was 6.3%, goal is to keep it under 7%. Please increase the ozempic, samples given today   Restart at the 0.25 mg WEEKLY skin shot.   Bring your machine with your to next appt.      Please set up a consultation for new gout medications that might help     please consider a podiatry consultation for diabetes. Also get a yearly dilated eye examination and monitor/check feet daily for injuries.      Referral to our clinical pharmacist to help with coverage and medication cost.     Please follow up in 1 months for weight recheck and BP recheck.      Nia Noriega DO, MSMed, ABOM  [address]   [address]   [place] [address]  Ph. [phone]  Fx. [phone]

## 2024-05-01 ENCOUNTER — APPOINTMENT (OUTPATIENT)
Dept: RADIOLOGY | Facility: HOSPITAL | Age: 54
End: 2024-05-01
Payer: MEDICARE

## 2024-05-01 ENCOUNTER — HOSPITAL ENCOUNTER (EMERGENCY)
Facility: HOSPITAL | Age: 54
Discharge: HOME | End: 2024-05-01
Attending: EMERGENCY MEDICINE
Payer: MEDICARE

## 2024-05-01 VITALS
HEART RATE: 98 BPM | DIASTOLIC BLOOD PRESSURE: 103 MMHG | HEIGHT: 72 IN | OXYGEN SATURATION: 95 % | SYSTOLIC BLOOD PRESSURE: 147 MMHG | WEIGHT: 315 LBS | TEMPERATURE: 98 F | RESPIRATION RATE: 16 BRPM | BODY MASS INDEX: 42.66 KG/M2

## 2024-05-01 DIAGNOSIS — M10.9 ACUTE GOUT OF RIGHT HAND, UNSPECIFIED CAUSE: Primary | ICD-10-CM

## 2024-05-01 PROCEDURE — 96372 THER/PROPH/DIAG INJ SC/IM: CPT | Performed by: EMERGENCY MEDICINE

## 2024-05-01 PROCEDURE — 2500000004 HC RX 250 GENERAL PHARMACY W/ HCPCS (ALT 636 FOR OP/ED): Performed by: EMERGENCY MEDICINE

## 2024-05-01 PROCEDURE — 99283 EMERGENCY DEPT VISIT LOW MDM: CPT

## 2024-05-01 RX ORDER — INDOMETHACIN 50 MG/1
50 CAPSULE ORAL
Qty: 20 CAPSULE | Refills: 0 | Status: SHIPPED | OUTPATIENT
Start: 2024-05-01 | End: 2024-05-11

## 2024-05-01 RX ORDER — KETOROLAC TROMETHAMINE 30 MG/ML
30 INJECTION, SOLUTION INTRAMUSCULAR; INTRAVENOUS ONCE
Status: COMPLETED | OUTPATIENT
Start: 2024-05-01 | End: 2024-05-01

## 2024-05-01 RX ADMIN — KETOROLAC TROMETHAMINE 30 MG: 30 INJECTION, SOLUTION INTRAMUSCULAR at 08:25

## 2024-05-01 ASSESSMENT — PAIN - FUNCTIONAL ASSESSMENT: PAIN_FUNCTIONAL_ASSESSMENT: 0-10

## 2024-05-01 ASSESSMENT — PAIN SCALES - GENERAL: PAINLEVEL_OUTOF10: 10 - WORST POSSIBLE PAIN

## 2024-05-01 NOTE — DISCHARGE INSTRUCTIONS
Only take medications as directed.  Rest ice elevate swollen painful joints.  Follow-up with your primary care provider early next week.

## 2024-05-01 NOTE — ED PROVIDER NOTES
Department of Emergency Medicine   ED  Provider Note  Admit Date/RoomTime: 5/1/2024  8:11 AM  ED Room: SHRUTHI/SHRUTHI                  History of Present Illness:   Gabriel Davis is a 53 y.o. male presenting to the ED for Acute Gout of Right hand and Great Toe, beginning and worsening last few days.  The complaint has been persistent, moderate in severity, and worsened by  movement .  Patient denies fever or chills.  No chest pain shortness of breath or abdominal pain.  Patient states that he is usually treated with indomethacin but cannot wait for his prescription to be filled prescription signed into the ER for further evaluation.  He says this is similar to his gouty attacks in the past.  Past medical history of diabetes, CAD, gouty arthritis, atrophic kidney disease, HTN, GHANSHYAM.  No history of trauma or injury.      Review of Systems:   Pertinent positives and review of systems as noted above.  Remaining 10 review of systems is negative or noncontributory to today's episode of care.  Review of Systems   Complete review of systems is otherwise negative except as noted above    --------------------------------------------- PAST HISTORY ---------------------------------------------  Past Medical History:  has a past medical history of Ganglion, unspecified site (09/24/2015), Gout, unspecified (12/09/2013), Personal history of other diseases of the circulatory system (07/17/2017), and Personal history of other endocrine, nutritional and metabolic disease (07/21/2014).    Past Surgical History:  has no past surgical history on file.    Social History:  reports that he has never smoked. He has never used smokeless tobacco. He reports that he does not drink alcohol and does not use drugs.    Family History: family history is not on file. Unless otherwise noted, family history is non contributory    Patient's Medications   New Prescriptions    INDOMETHACIN (INDOCIN) 50 MG CAPSULE    Take 1 capsule (50 mg) by mouth 2 times a day  with meals for 10 days.   Previous Medications    AMLODIPINE (NORVASC) 10 MG TABLET    Take 1 tablet (10 mg) by mouth once daily.    INDOMETHACIN (INDOCIN) 50 MG CAPSULE    Take 1 capsule (50 mg) by mouth 3 times a day with meals.    LOSARTAN (COZAAR) 100 MG TABLET    Take 1 tablet (100 mg) by mouth once daily.    METOPROLOL SUCCINATE XL (TOPROL-XL) 50 MG 24 HR TABLET    Take 1 tablet (50 mg) by mouth once daily. Do not crush or chew.    SEMAGLUTIDE 0.25 MG OR 0.5 MG (2 MG/3 ML) PEN INJECTOR    Inject 0.5 mg under the skin 1 (one) time per week.    SPIRONOLACTONE (ALDACTONE) 50 MG TABLET    Take 1 tablet (50 mg) by mouth once daily.   Modified Medications    No medications on file   Discontinued Medications    No medications on file      The patient’s home medications have been reviewed.    Allergies: Patient has no known allergies.    -------------------------------------------------- RESULTS -------------------------------------------------  All laboratory and radiology results have been personally reviewed by myself   LABS:  Labs Reviewed - No data to display      RADIOLOGY:  Interpreted by Radiologist.  No orders to display       No results found for this or any previous visit (from the past 4464 hour(s)).  ------------------------- NURSING NOTES AND VITALS REVIEWED ---------------------------   The nursing notes within the ED encounter and vital signs as below have been reviewed.   BP (!) 147/103 (BP Location: Left arm)   Pulse 98   Temp 36.7 °C (98 °F) (Temporal)   Resp 16   Ht 1.829 m (6')   Wt 149 kg (329 lb)   SpO2 95%   BMI 44.62 kg/m²   Oxygen Saturation Interpretation: Normal      ---------------------------------------------------PHYSICAL EXAM--------------------------------------  Physical Exam  Vitals and nursing note reviewed.   Constitutional:       General: He is not in acute distress.     Appearance: He is well-developed. He is obese. He is not ill-appearing or toxic-appearing.   HENT:       Head: Normocephalic and atraumatic.      Nose: Nose normal.      Mouth/Throat:      Mouth: Mucous membranes are moist.      Pharynx: Oropharynx is clear. No oropharyngeal exudate or posterior oropharyngeal erythema.   Eyes:      General: No scleral icterus.     Extraocular Movements: Extraocular movements intact.      Conjunctiva/sclera: Conjunctivae normal.      Pupils: Pupils are equal, round, and reactive to light.   Cardiovascular:      Rate and Rhythm: Normal rate and regular rhythm.      Heart sounds: No murmur heard.  Pulmonary:      Effort: Pulmonary effort is normal. No respiratory distress.      Breath sounds: Normal breath sounds. No wheezing, rhonchi or rales.   Abdominal:      Palpations: Abdomen is soft.   Musculoskeletal:         General: Tenderness present. No swelling, deformity or signs of injury.      Cervical back: Normal range of motion and neck supple. No rigidity or tenderness.      Right lower leg: No edema.      Left lower leg: No edema.      Comments: There are some swelling over the dorsum of the right hand just proximal to the third MCP joint.  It is warm to the touch.  There is no open wound or sore.  He can fully flex and extend all fingers and they are neurovascularly intact.   Skin:     General: Skin is warm and dry.      Capillary Refill: Capillary refill takes less than 2 seconds.      Coloration: Skin is not jaundiced or pale.      Findings: Erythema present. No bruising, lesion or rash.   Neurological:      Mental Status: He is alert and oriented to person, place, and time.   Psychiatric:         Mood and Affect: Mood normal.            Procedures  None  ------------------------------ ED COURSE/MEDICAL DECISION MAKING----------------------    Medical Decision Making:   Patient was seen and examined by me.  History and exam was consistent with acute gouty arthritis flare.  Toradol 30 mg IM x 1.    Will discharge patient home with a prescription for indomethacin.  He has taken this  in the past and had good results.  Patient will be discharged home to follow-up with primary care in 3 to 5 days as needed, sooner if worse return.    Diagnoses as of 05/01/24 0841   Acute gout of right hand, unspecified cause      Counseling:   The emergency provider has spoken with the patient and discussed today’s results, in addition to providing specific details for the plan of care and counseling regarding the diagnosis and prognosis.  Questions are answered at this time and they are agreeable with the plan.      --------------------------------- IMPRESSION AND DISPOSITION ---------------------------------        IMPRESSION  1. Acute gout of right hand, unspecified cause        DISPOSITION  Disposition: Discharge to home  Patient condition is fair      Billing Provider Critical Care Time: 0 minutes     Barry Taylor, DO  05/01/24 0820       Barry Taylor, DO  05/01/24 0822       Barry Taylor, DO  05/01/24 0841

## 2024-05-06 ENCOUNTER — OFFICE VISIT (OUTPATIENT)
Dept: PRIMARY CARE | Facility: CLINIC | Age: 54
End: 2024-05-06
Payer: MEDICARE

## 2024-05-06 VITALS
WEIGHT: 315 LBS | OXYGEN SATURATION: 99 % | BODY MASS INDEX: 44.35 KG/M2 | HEART RATE: 75 BPM | DIASTOLIC BLOOD PRESSURE: 90 MMHG | SYSTOLIC BLOOD PRESSURE: 155 MMHG

## 2024-05-06 DIAGNOSIS — I10 UNCONTROLLED HYPERTENSION: Primary | ICD-10-CM

## 2024-05-06 DIAGNOSIS — M10.00 IDIOPATHIC GOUT, UNSPECIFIED CHRONICITY, UNSPECIFIED SITE: ICD-10-CM

## 2024-05-06 DIAGNOSIS — L02.91 ABSCESS: ICD-10-CM

## 2024-05-06 PROCEDURE — 4010F ACE/ARB THERAPY RXD/TAKEN: CPT | Performed by: FAMILY MEDICINE

## 2024-05-06 PROCEDURE — 3008F BODY MASS INDEX DOCD: CPT | Performed by: FAMILY MEDICINE

## 2024-05-06 PROCEDURE — 87186 SC STD MICRODIL/AGAR DIL: CPT

## 2024-05-06 PROCEDURE — 3061F NEG MICROALBUMINURIA REV: CPT | Performed by: FAMILY MEDICINE

## 2024-05-06 PROCEDURE — 87070 CULTURE OTHR SPECIMN AEROBIC: CPT

## 2024-05-06 PROCEDURE — 3077F SYST BP >= 140 MM HG: CPT | Performed by: FAMILY MEDICINE

## 2024-05-06 PROCEDURE — 3048F LDL-C <100 MG/DL: CPT | Performed by: FAMILY MEDICINE

## 2024-05-06 PROCEDURE — 1036F TOBACCO NON-USER: CPT | Performed by: FAMILY MEDICINE

## 2024-05-06 PROCEDURE — 87075 CULTR BACTERIA EXCEPT BLOOD: CPT

## 2024-05-06 PROCEDURE — 87205 SMEAR GRAM STAIN: CPT

## 2024-05-06 PROCEDURE — 3080F DIAST BP >= 90 MM HG: CPT | Performed by: FAMILY MEDICINE

## 2024-05-06 RX ORDER — AMOXICILLIN AND CLAVULANATE POTASSIUM 875; 125 MG/1; MG/1
875 TABLET, FILM COATED ORAL 2 TIMES DAILY
Qty: 20 TABLET | Refills: 0 | Status: SHIPPED | OUTPATIENT
Start: 2024-05-06 | End: 2024-05-16

## 2024-05-06 RX ORDER — LIDOCAINE HYDROCHLORIDE 20 MG/ML
10 INJECTION, SOLUTION INFILTRATION; PERINEURAL ONCE
Status: COMPLETED | OUTPATIENT
Start: 2024-05-06 | End: 2024-05-06

## 2024-05-06 RX ADMIN — LIDOCAINE HYDROCHLORIDE 10 ML: 20 INJECTION, SOLUTION INFILTRATION; PERINEURAL at 09:26

## 2024-05-06 ASSESSMENT — PATIENT HEALTH QUESTIONNAIRE - PHQ9
2. FEELING DOWN, DEPRESSED OR HOPELESS: NOT AT ALL
SUM OF ALL RESPONSES TO PHQ9 QUESTIONS 1 AND 2: 0
1. LITTLE INTEREST OR PLEASURE IN DOING THINGS: NOT AT ALL

## 2024-05-06 NOTE — PROGRESS NOTES
Subjective   Patient is a 53 y.o. male presents for 1 month follow up    Previous medication list on 2/6/23: Ozeomic 0.5 mg weekly, Indomethacin 50 mg prn , amlodipine 10 mg , losartan 100 mg daily, spironolactone 25 mg daily, albuterol, night time O2  Current Meds: amlodipine 10 mg , indomethacin 50 mg , losartan 100 mg      #) back abscess  - I&D today   - noticed it a few days ago  - thought it was bug bite   Patient ID: Gabriel Davis is a 53 y.o. male.    ProceduresIncision and Drainage Procedure Note  Diagnosis: abscess  Location: back - left, upper  Informed Consent: Discussed risks (permanent scarring, light or dark discoloration, infection, pain, bleeding, bruising, redness, damage to adjacent structures, and recurrence of the lesion) and benefits of the procedure, as well as the alternatives.  Informed consent was obtained.  Preparation: The area was prepared and draped in a standard fashion.  Anesthesia: Lidocaine 2% without epinephrine  Procedure Details: An incision was made overlying the lesion. The lesion drained pus, white, chalky cyst material, straw-colored fluid, and blood.   A large amount of fluid was drained.   The lesion was multiloculated.   Due to its large size, the cavity was packed with iodiform gauze.   The patient was instructed to return in 2-3 days for packing removal.     Antibiotic ointment and a sterile pressure dressing were applied. The patient tolerated procedure well.  Total number of lesions drained: 1  Plan: The patient was instructed on post-op care. Recommend OTC analgesia as needed for pain.    #) gout- last flare in the Fall 2023  - diuretic seemed to trigger a flare   - clavile, wrist and ankle pains   - high uric acid on 2/24 8.5   - does not want medications for this      #) T2DM - stable   - sugars have been low  - A1c 8.8% on Oct 7 2021--> 6.5% 4/11/22---> 5.7%  2/6/23 --> 6.4% --> 6.0% 12/5/23 --> 6.3% 4/1/24  - was put on insulin in the hospital with covid- off  insulin now  - on metformin, and glipizide--> started on ozempic  - weight down 7# , and another 33# --> another 23#,, then stopped taking it and regained most of the weight back   - current weight 327, down 12# since last visit      #) HTN - UNCONTROLLED   -- ho,e readings are around 150/80s  - 142/88 --> 132/80--> 148/80 --> 170/102--> 184/118 --> 214/118 --> 178/102 --> 155/90 today   - on amlodipine, losartan and then metoprolol 50 mg   - stopped the spironolactone  - made him nauseated   also on blood thinner prophylaxis, will run out in 2 weeks.      #) severe sleep apnea. - not on treatment   - referral to sleep med  - rec continued home O2 at night     #) bilateral lege swelling and pain   - DVT was ruled out   - still having the left groin pain   - still very fatigued  - this is limited ability to stay active   - needs abdominal imaging to rule out obstructive      wt down from 376 (2021)--> 309 --> 333 --> 339 --> 327      Review of system are negative unless otherwise stated in the HPI.     Objective     /90   Pulse 75   Wt 148 kg (327 lb)   SpO2 99%   BMI 44.35 kg/m²     Physical Examination  GEN: A+O, no acute distress  HEENT: NC/AT, Oropharynx clear, no exudates, TM visualized, Extraoccular muscles intact, no facial droop; no thyromegaly or cervical LAD  RESP: CTAB, no wheezes   CV: RRR, no murmurs  ABD: soft, non-tender, + BS  SKIN: no rashes or bruising, no peripheral edema   NEURO: CN II-XII grossly intact, moves all extremities equally, no tremor   PSYCH: normal affect, appropriate mood     Assessment/Plan     Active Problems:  There are no active Hospital Problems.  Myrtle Davis is a 53 y.o. male who presents for Follow-up (1 month BP check ).    HPI    ROS was completed and all systems are negative with the exception of what was noted in the the HPI.     Objective     /90   Pulse 75   Wt 148 kg (327 lb)   SpO2 99%   BMI 44.35 kg/m²      Physical Exam  GEN: A+O,  no acute distress, obese male  HEENT: NC/AT, Oropharynx clear, no exudates, TM visualized, Extraoccular muscles intact, no facial droop; no thyromegaly or cervical LAD  RESP: CTAB, no wheezes   CV: RRR, no murmurs  ABD: soft, non-tender, + BS  SKIN: no rashes or bruising, no peripheral edema, large purulent, fluctuant abscess on the upper right back    NEURO: CN II-XII grossly intact, moves all extremities equally, no tremor   PSYCH: normal affect, appropriate mood     Assessment/Plan   Problem List Items Addressed This Visit       Gout    Uncontrolled hypertension - Primary     Other Visit Diagnoses       Abscess        Relevant Medications    amoxicillin-pot clavulanate (Augmentin) 875-125 mg tablet    lidocaine (Xylocaine) 20 mg/mL (2 %) injection 10 mL (Start on 5/6/2024  9:45 AM)    Other Relevant Orders    Tissue/Wound Culture/Smear        Labs on 2/5/24 - reviewed.     We I&D the right upper back lesion  We packed it with gauze, ,please remove the packing in 2 days , then keep clean and dry, covered until closed     Take the antibiotic until gone    We will let you know if we need to change the antibiotic based on the culture results.     Great job with the 12# weight loss in 30 days.      CT of the chest in 1/17/24- showed some scarring (likely from the bad covid infection), so some dilation of the arteries to the lung from untreated sleep apnea and the scarring of the lungs.     Order for a CT of the abdomen and pelvic to looks for abdominal restrictions / clots, hernias for the bilateral leg swelling - insurance denies first order for this.  We did a better job documenting this time, I still feel like we need this. -- we are working on the insurance to approve this.     ECHO was done on 9/21/21.     Please try to reach out to patient assistance for the bills.     Referral to nurse practitioner Juana to help with treatment of the sleep apnea.      Blood pressure is better but still not at goal. 155/90  today, would like this under 130/80.   Continue the amlodipine and losartan  START the metoproolol 50 mg daily for better BP control  STOP the spironolactone due to nausea     Please continue to check blood pressure eat home, goal is less than 130/80   Call in 1 WEEK with home readings.     weight is back up 30#. And up another 6#      A1c on 4/1/24 was 6.3%, goal is to keep it under 7%. Please increase the ozempic, samples given today   Restart at the 0.25 mg WEEKLY skin shot. Increase to 0.5 mg weekly   Bring your machine with your to next appt.      Please set up a consultation for new gout medications that might help   Or work really hard on the gout diet. Handout provided.     please consider a podiatry consultation for diabetes. Also get a yearly dilated eye examination and monitor/check feet daily for injuries.      Referral to our clinical pharmacist to help with coverage and medication cost.     Please follow up in 3 months for weight recheck and BP recheck and A1c check     Nia Noriega DO, MSMed, ABOM  [address]   [address]   [place] [address]  Ph. [phone]  Fx. [phone]

## 2024-05-06 NOTE — PATIENT INSTRUCTIONS
Labs on 2/5/24 - reviewed.     We I&D the right upper back lesion  We packed it with gauze, ,please remove the packing in 2 days , then keep clean and dry, covered until closed     Take the antibiotic until gone    We will let you know if we need to change the antibiotic based on the culture results.     Great job with the 12# weight loss in 30 days.      CT of the chest in 1/17/24- showed some scarring (likely from the bad covid infection), so some dilation of the arteries to the lung from untreated sleep apnea and the scarring of the lungs.     Order for a CT of the abdomen and pelvic to looks for abdominal restrictions / clots, hernias for the bilateral leg swelling - insurance denies first order for this.  We did a better job documenting this time, I still feel like we need this. -- we are working on the insurance to approve this.     ECHO was done on 9/21/21.     Please try to reach out to patient assistance for the bills.     Referral to nurse practitioner Juana to help with treatment of the sleep apnea.      Blood pressure is better but still not at goal. 155/90 today, would like this under 130/80.   Continue the amlodipine and losartan  START the metoproolol 50 mg daily for better BP control  STOP the spironolactone due to nausea     Please continue to check blood pressure eat home, goal is less than 130/80   Call in 1 WEEK with home readings.     weight is back up 30#. And up another 6#      A1c on 4/1/24 was 6.3%, goal is to keep it under 7%. Please increase the ozempic, samples given today   Restart at the 0.25 mg WEEKLY skin shot. Increase to 0.5 mg weekly   Bring your machine with your to next appt.      Please set up a consultation for new gout medications that might help   Or work really hard on the gout diet. Handout provided.     please consider a podiatry consultation for diabetes. Also get a yearly dilated eye examination and monitor/check feet daily for injuries.      Referral to our clinical  pharmacist to help with coverage and medication cost.     Please follow up in 3 months for weight recheck and BP recheck and A1c check

## 2024-05-08 ENCOUNTER — APPOINTMENT (OUTPATIENT)
Dept: RADIOLOGY | Facility: HOSPITAL | Age: 54
End: 2024-05-08
Payer: MEDICARE

## 2024-05-09 LAB
BACTERIA SPEC CULT: ABNORMAL
GRAM STN SPEC: ABNORMAL
GRAM STN SPEC: ABNORMAL

## 2024-05-17 DIAGNOSIS — M79.89 SWELLING OF LEFT LOWER EXTREMITY: ICD-10-CM

## 2024-05-17 DIAGNOSIS — M79.89 SYMPTOM OF LEG SWELLING: Primary | ICD-10-CM

## 2024-08-05 ENCOUNTER — APPOINTMENT (OUTPATIENT)
Dept: PRIMARY CARE | Facility: CLINIC | Age: 54
End: 2024-08-05
Payer: MEDICARE

## 2024-08-05 DIAGNOSIS — M10.00 IDIOPATHIC GOUT, UNSPECIFIED CHRONICITY, UNSPECIFIED SITE: Primary | ICD-10-CM

## 2024-08-05 RX ORDER — ALLOPURINOL 100 MG/1
100 TABLET ORAL DAILY
Qty: 90 TABLET | Refills: 2 | Status: SHIPPED | OUTPATIENT
Start: 2024-08-05

## 2024-10-21 ENCOUNTER — APPOINTMENT (OUTPATIENT)
Dept: PRIMARY CARE | Facility: CLINIC | Age: 54
End: 2024-10-21
Payer: MEDICARE

## 2024-11-11 DIAGNOSIS — M1A.9XX0 CHRONIC GOUT WITHOUT TOPHUS, UNSPECIFIED CAUSE, UNSPECIFIED SITE: ICD-10-CM

## 2024-11-11 RX ORDER — INDOMETHACIN 50 MG/1
50 CAPSULE ORAL
Qty: 270 CAPSULE | Refills: 3 | Status: SHIPPED | OUTPATIENT
Start: 2024-11-11

## 2025-01-21 ENCOUNTER — APPOINTMENT (OUTPATIENT)
Dept: PRIMARY CARE | Facility: CLINIC | Age: 55
End: 2025-01-21

## 2025-02-20 DIAGNOSIS — M1A.9XX0 CHRONIC GOUT WITHOUT TOPHUS, UNSPECIFIED CAUSE, UNSPECIFIED SITE: ICD-10-CM

## 2025-02-20 RX ORDER — INDOMETHACIN 50 MG/1
50 CAPSULE ORAL 2 TIMES DAILY PRN
Qty: 30 CAPSULE | Refills: 0 | Status: SHIPPED | OUTPATIENT
Start: 2025-02-20 | End: 2025-03-22

## 2025-03-03 ENCOUNTER — APPOINTMENT (OUTPATIENT)
Dept: PRIMARY CARE | Facility: CLINIC | Age: 55
End: 2025-03-03

## 2025-03-03 VITALS
HEART RATE: 83 BPM | WEIGHT: 315 LBS | SYSTOLIC BLOOD PRESSURE: 132 MMHG | DIASTOLIC BLOOD PRESSURE: 84 MMHG | BODY MASS INDEX: 47.33 KG/M2 | OXYGEN SATURATION: 97 %

## 2025-03-03 DIAGNOSIS — G89.29 CHRONIC LEFT HIP PAIN: ICD-10-CM

## 2025-03-03 DIAGNOSIS — M10.00 IDIOPATHIC GOUT, UNSPECIFIED CHRONICITY, UNSPECIFIED SITE: ICD-10-CM

## 2025-03-03 DIAGNOSIS — Z00.00 ROUTINE GENERAL MEDICAL EXAMINATION AT A HEALTH CARE FACILITY: Primary | ICD-10-CM

## 2025-03-03 DIAGNOSIS — R79.89 LOW VITAMIN D LEVEL: ICD-10-CM

## 2025-03-03 DIAGNOSIS — E11.22 TYPE 2 DIABETES MELLITUS WITH STAGE 3B CHRONIC KIDNEY DISEASE, WITHOUT LONG-TERM CURRENT USE OF INSULIN (MULTI): ICD-10-CM

## 2025-03-03 DIAGNOSIS — M1A.9XX0 CHRONIC GOUT WITHOUT TOPHUS, UNSPECIFIED CAUSE, UNSPECIFIED SITE: ICD-10-CM

## 2025-03-03 DIAGNOSIS — G47.33 SEVERE OBSTRUCTIVE SLEEP APNEA: ICD-10-CM

## 2025-03-03 DIAGNOSIS — M25.552 CHRONIC LEFT HIP PAIN: ICD-10-CM

## 2025-03-03 DIAGNOSIS — R10.32 LEFT GROIN PAIN: ICD-10-CM

## 2025-03-03 DIAGNOSIS — Z12.5 PROSTATE CANCER SCREENING: ICD-10-CM

## 2025-03-03 DIAGNOSIS — N18.32 TYPE 2 DIABETES MELLITUS WITH STAGE 3B CHRONIC KIDNEY DISEASE, WITHOUT LONG-TERM CURRENT USE OF INSULIN (MULTI): ICD-10-CM

## 2025-03-03 LAB — POC HEMOGLOBIN A1C: 6.5 % (ref 4.2–6.5)

## 2025-03-03 PROCEDURE — 99396 PREV VISIT EST AGE 40-64: CPT | Performed by: FAMILY MEDICINE

## 2025-03-03 PROCEDURE — 99214 OFFICE O/P EST MOD 30 MIN: CPT | Performed by: FAMILY MEDICINE

## 2025-03-03 PROCEDURE — 83036 HEMOGLOBIN GLYCOSYLATED A1C: CPT | Performed by: FAMILY MEDICINE

## 2025-03-03 PROCEDURE — 3079F DIAST BP 80-89 MM HG: CPT | Performed by: FAMILY MEDICINE

## 2025-03-03 PROCEDURE — 1036F TOBACCO NON-USER: CPT | Performed by: FAMILY MEDICINE

## 2025-03-03 PROCEDURE — 3075F SYST BP GE 130 - 139MM HG: CPT | Performed by: FAMILY MEDICINE

## 2025-03-03 RX ORDER — INDOMETHACIN 50 MG/1
50 CAPSULE ORAL 2 TIMES DAILY PRN
Qty: 90 CAPSULE | Refills: 1 | Status: SHIPPED | OUTPATIENT
Start: 2025-03-03 | End: 2026-03-03

## 2025-03-03 ASSESSMENT — PATIENT HEALTH QUESTIONNAIRE - PHQ9
SUM OF ALL RESPONSES TO PHQ9 QUESTIONS 1 AND 2: 0
1. LITTLE INTEREST OR PLEASURE IN DOING THINGS: NOT AT ALL
2. FEELING DOWN, DEPRESSED OR HOPELESS: NOT AT ALL

## 2025-03-03 NOTE — PROGRESS NOTES
Myrtle Davis is a 54 y.o. male who presents for Annual Exam (Physical).    HPI  Previous medication list on 2/6/23: Ozeomic 0.5 mg weekly, Indomethacin 50 mg prn , amlodipine 10 mg , losartan 100 mg daily, spironolactone 25 mg daily, albuterol, night time O2  Current Meds: amlodipine 10 mg , indomethacin 50 mg , losartan 100 mg    labs due again now .     #) back abscess- resolved   - I&D on 5/6/25  - noticed it a few days ago  - thought it was bug bite     #) gout- last flare in the Fall 2023  - diuretic seemed to trigger a flare   - clavile, wrist and ankle pains   - high uric acid on 2/24 8.5   - does not want medications for this      #) T2DM - stable   - sugars have been low  - A1c 8.8% on Oct 7 2021--> 6.5% 4/11/22---> 5.7%  2/6/23 --> 6.4% --> 6.0% 12/5/23 --> 6.3% 4/1/24 --> 6.5%  3/3/25   - was put on insulin in the hospital with covid- off insulin now  - on metformin, and glipizide--> started on ozempic  - weight down 7# , and another 33# --> another 23#,, then stopped taking it and regained most of the weight back   - current weight 327, down 12# since last visit      #) HTN - UNCONTROLLED   -- home readings are around 150/80s  - 142/88 --> 132/80--> 148/80 --> 170/102--> 184/118 --> 214/118 --> 178/102 --> 155/90 --> 132/84  today   - on amlodipine, losartan and then metoprolol 50 mg   - stopped the spironolactone  - made him nauseated   also on blood thinner prophylaxis, will run out in 2 weeks.      #) severe sleep apnea. - not on treatment   - referral to sleep med in the past   - rec continued home O2 at night      #) bilateral leg swelling and pain with left chronic hip pain  - was an athlete in the past   - has not done PT yet   - no imaging of the hip yet   - DVT was ruled out   - still having the left groin pain   - still very fatigued  - this is limited ability to stay active   - needs abdominal imaging to rule out obstructive      #) obesity   - BMI 47   - wt 376 (2021)--> 309 -->  "333 --> 339 --> 327 --> 349     ROS was completed and all systems are negative with the exception of what was noted in the the HPI.     Objective     /84   Pulse 83   Wt (!) 158 kg (349 lb)   SpO2 97%   BMI 47.33 kg/m²      Last Labs:     CMP:   Lab Results   Component Value Date    CALCIUM 9.4 02/05/2024    CALCIUM 8.8 10/07/2021    CALCIUM 8.8 10/01/2021    PROT 7.2 02/05/2024    PROT 6.7 10/07/2021    PROT 6.6 10/01/2021    ALBUMIN 4.1 02/05/2024    ALBUMIN 3.6 10/07/2021    ALBUMIN 3.1 (L) 10/01/2021    AST 11 02/05/2024    AST 12 10/07/2021    AST 11 10/01/2021    ALKPHOS 85 02/05/2024    ALKPHOS 47 10/07/2021    ALKPHOS 45 10/01/2021    BILITOT 0.6 02/05/2024    BILITOT 0.9 10/07/2021    BILITOT 0.3 10/01/2021     CBC:   Lab Results   Component Value Date    WBC 6.1 02/05/2024    WBC 9.0 10/07/2021    WBC 17.6 (H) 10/01/2021    HGB 12.1 (L) 02/05/2024    HGB 10.7 (L) 10/07/2021    HGB 10.2 (L) 10/01/2021    HCT 39.4 (L) 02/05/2024    HCT 34.3 (L) 10/07/2021    HCT 32.2 (L) 10/01/2021    MCV 72 (L) 02/05/2024    MCV 74 (L) 10/07/2021    MCV 72 (L) 10/01/2021     02/05/2024     (L) 10/07/2021     10/01/2021     A1C:   Lab Results   Component Value Date    HGBA1C 6.3 04/01/2024    HGBA1C 6.0 12/05/2023     LIPID PANEL:   Lab Results   Component Value Date    CHOL 150 02/05/2024    CHOL 168 10/07/2021    CHOL 126 02/10/2020    TRIG 73 02/05/2024    TRIG 84 10/07/2021    TRIG 63 02/10/2020    HDL 38.8 02/05/2024    HDL 42.7 10/07/2021    HDL 36.4 (A) 02/10/2020    CHHDL 3.9 02/05/2024    CHHDL 3.9 10/07/2021    CHHDL 3.5 02/10/2020    LDLF 109 (H) 10/07/2021    LDLF 77 02/10/2020    VLDL 15 02/05/2024    VLDL 17 10/07/2021    VLDL 13 02/10/2020    NHDL 111 02/05/2024     TSH:   Lab Results   Component Value Date    TSH 3.65 02/05/2024    TSH 2.75 10/07/2021    TSH 1.88 02/10/2020     PSA:   No results found for: \"PSA\"    Physical Exam  GEN: A+O, no acute distress, male with " obesity  HEENT: NC/AT, Oropharynx clear, no exudates, TM visualized, Extraoccular muscles intact, no facial droop; no thyromegaly or cervical LAD  RESP: CTAB, no wheezes   CV: RRR, no murmurs  ABD: soft, non-tender, + BS  SKIN: no rashes or bruising, no peripheral edema   NEURO: CN II-XII grossly intact, moves all extremities equally, no tremor   PSYCH: normal affect, appropriate mood     Assessment/Plan   Problem List Items Addressed This Visit    None    Labs on 2/5/24 - reviewed.  Due for updated labs now, orders placed.     Continue to work on weight reduction     Referral for PT to develop a home exercise program     Get xrays of the left hip      CT of the chest in 1/17/24- showed some scarring (likely from the bad covid infection), so some dilation of the arteries to the lung from untreated sleep apnea and the scarring of the lungs.     ECHO was done on 9/21/21.     Referral to a new sleep specialist for the sleep apnea.      Blood pressure looks better today at 132/84, would like this under 130/80.  Continue to monitor at home  Continue the amlodipine and losartan WITH the metoproolol 50 mg daily for better BP control  STOP the spironolactone due to nausea      A1c today was 6.5%, goal is to keep it under 7%.   Continue the Ozempic 0.5 mg weekly   Bring your blood sugar machine with your to next appt.      Or work really hard on the gout diet. Handout provided last visit    please consider a podiatry consultation for diabetes. Also get a yearly dilated eye examination and monitor/check feet daily for injuries.     Please follow up in 4 months for weight recheck and BP recheck and A1c check        Nia Noriega, DO, MSMed, ABOM  [address]   [address]   [place] [address]  Ph. [phone]  Fx. [phone]

## 2025-03-03 NOTE — PATIENT INSTRUCTIONS
Labs on 2/5/24 - reviewed.  Due for updated labs now, orders placed.     Continue to work on weight reduction     Referral for PT to develop a home exercise program     Get xrays of the left hip      CT of the chest in 1/17/24- showed some scarring (likely from the bad covid infection), so some dilation of the arteries to the lung from untreated sleep apnea and the scarring of the lungs.     ECHO was done on 9/21/21.     Referral to a new sleep specialist for the sleep apnea.      Blood pressure looks better today at 132/84, would like this under 130/80.  Continue to monitor at home  Continue the amlodipine and losartan WITH the metoproolol 50 mg daily for better BP control  STOP the spironolactone due to nausea      A1c today was 6.5%, goal is to keep it under 7%.   Continue the Ozempic 0.5 mg weekly   Bring your blood sugar machine with your to next appt.      Or work really hard on the gout diet. Handout provided last visit    please consider a podiatry consultation for diabetes. Also get a yearly dilated eye examination and monitor/check feet daily for injuries.     Please follow up in 4 months for weight recheck and BP recheck and A1c check

## 2025-05-14 DIAGNOSIS — I10 UNCONTROLLED HYPERTENSION: Primary | ICD-10-CM

## 2025-07-03 ENCOUNTER — APPOINTMENT (OUTPATIENT)
Dept: PRIMARY CARE | Facility: CLINIC | Age: 55
End: 2025-07-03
Payer: MEDICARE

## 2025-07-21 ENCOUNTER — APPOINTMENT (OUTPATIENT)
Dept: PRIMARY CARE | Facility: CLINIC | Age: 55
End: 2025-07-21
Payer: MEDICARE

## 2025-08-04 ENCOUNTER — APPOINTMENT (OUTPATIENT)
Dept: PRIMARY CARE | Facility: CLINIC | Age: 55
End: 2025-08-04
Payer: MEDICARE

## 2025-08-04 VITALS
HEART RATE: 106 BPM | OXYGEN SATURATION: 98 % | WEIGHT: 315 LBS | SYSTOLIC BLOOD PRESSURE: 136 MMHG | BODY MASS INDEX: 45.98 KG/M2 | DIASTOLIC BLOOD PRESSURE: 86 MMHG

## 2025-08-04 DIAGNOSIS — E11.22 TYPE 2 DIABETES MELLITUS WITH STAGE 3B CHRONIC KIDNEY DISEASE, WITHOUT LONG-TERM CURRENT USE OF INSULIN (MULTI): Primary | ICD-10-CM

## 2025-08-04 DIAGNOSIS — M1A.09X0 CHRONIC GOUT OF MULTIPLE SITES, UNSPECIFIED CAUSE: ICD-10-CM

## 2025-08-04 DIAGNOSIS — M1A.9XX0 CHRONIC GOUT WITHOUT TOPHUS, UNSPECIFIED CAUSE, UNSPECIFIED SITE: ICD-10-CM

## 2025-08-04 DIAGNOSIS — I16.0 ASYMPTOMATIC HYPERTENSIVE URGENCY: ICD-10-CM

## 2025-08-04 DIAGNOSIS — M79.89 SWELLING OF LEFT LOWER EXTREMITY: ICD-10-CM

## 2025-08-04 DIAGNOSIS — Z11.3 SCREEN FOR STD (SEXUALLY TRANSMITTED DISEASE): ICD-10-CM

## 2025-08-04 DIAGNOSIS — Z11.3 ROUTINE SCREENING FOR STI (SEXUALLY TRANSMITTED INFECTION): ICD-10-CM

## 2025-08-04 DIAGNOSIS — R10.32 LEFT GROIN PAIN: ICD-10-CM

## 2025-08-04 DIAGNOSIS — N18.32 TYPE 2 DIABETES MELLITUS WITH STAGE 3B CHRONIC KIDNEY DISEASE, WITHOUT LONG-TERM CURRENT USE OF INSULIN (MULTI): Primary | ICD-10-CM

## 2025-08-04 DIAGNOSIS — G89.29 CHRONIC LEFT HIP PAIN: ICD-10-CM

## 2025-08-04 DIAGNOSIS — G47.33 SEVERE OBSTRUCTIVE SLEEP APNEA: ICD-10-CM

## 2025-08-04 DIAGNOSIS — E55.9 AVITAMINOSIS D: ICD-10-CM

## 2025-08-04 DIAGNOSIS — M25.552 CHRONIC LEFT HIP PAIN: ICD-10-CM

## 2025-08-04 LAB — POC HEMOGLOBIN A1C: 6.4 % (ref 4.2–6.5)

## 2025-08-04 PROCEDURE — 83036 HEMOGLOBIN GLYCOSYLATED A1C: CPT | Performed by: FAMILY MEDICINE

## 2025-08-04 PROCEDURE — 3079F DIAST BP 80-89 MM HG: CPT | Performed by: FAMILY MEDICINE

## 2025-08-04 PROCEDURE — 1036F TOBACCO NON-USER: CPT | Performed by: FAMILY MEDICINE

## 2025-08-04 PROCEDURE — 99214 OFFICE O/P EST MOD 30 MIN: CPT | Performed by: FAMILY MEDICINE

## 2025-08-04 PROCEDURE — 3044F HG A1C LEVEL LT 7.0%: CPT | Performed by: FAMILY MEDICINE

## 2025-08-04 PROCEDURE — 3075F SYST BP GE 130 - 139MM HG: CPT | Performed by: FAMILY MEDICINE

## 2025-08-04 RX ORDER — ASPIRIN 325 MG
1.25 TABLET, DELAYED RELEASE (ENTERIC COATED) ORAL WEEKLY
Qty: 12 CAPSULE | Refills: 3 | Status: SHIPPED | OUTPATIENT
Start: 2025-08-04 | End: 2026-08-04

## 2025-08-04 RX ORDER — TIRZEPATIDE 2.5 MG/.5ML
2.5 INJECTION, SOLUTION SUBCUTANEOUS WEEKLY
Qty: 2 ML | Refills: 0 | Status: SHIPPED | OUTPATIENT
Start: 2025-08-04

## 2025-08-04 RX ORDER — TIRZEPATIDE 7.5 MG/.5ML
7.5 INJECTION, SOLUTION SUBCUTANEOUS WEEKLY
Qty: 2 ML | Refills: 0 | Status: SHIPPED | OUTPATIENT
Start: 2025-09-29

## 2025-08-04 RX ORDER — INDOMETHACIN 50 MG/1
50 CAPSULE ORAL 2 TIMES DAILY PRN
Qty: 90 CAPSULE | Refills: 1 | Status: SHIPPED | OUTPATIENT
Start: 2025-08-04 | End: 2026-08-04

## 2025-08-04 RX ORDER — TIRZEPATIDE 5 MG/.5ML
5 INJECTION, SOLUTION SUBCUTANEOUS WEEKLY
Qty: 2 ML | Refills: 0 | Status: SHIPPED | OUTPATIENT
Start: 2025-09-01 | End: 2025-10-01

## 2025-08-04 NOTE — PATIENT INSTRUCTIONS
Due for updated labs, will add on STD screening     A1c was ok at 6.4% , I would like to see if your insurance will cover the Mounaro for sleep and weight and sugar.     Continue to work on weight reduction.     Referral for PT to develop a home exercise program     Get xrays of the left hip , order in from March 2025.  Reordered      CT of the chest in 1/17/24- showed some scarring (likely from the bad covid infection), so some dilation of the arteries to the lung from untreated sleep apnea and the scarring of the lungs.     ECHO was done on 9/21/21.     Referral to a new sleep specialist for the sleep apnea last time.      Blood pressure looks better today at 132/84, would like this under 130/80.  Continue to monitor at home  Continue the amlodipine and losartan WITH the metoproolol 50 mg daily for better BP control  STOP the spironolactone due to nausea      A1c today was 6.3%, goal is to keep it under 7%.   Let try to get you on the tirzeptatide weekly   I sent in the 2.5 mg weekly dose , next month we go up to the 5 mg   Bring your blood sugar machine with your to next appt.     Refilled the vitamin D      Or work really hard on the gout diet. Handout provided last visit    please consider a podiatry consultation for diabetes. Also get a yearly dilated eye examination and monitor/check feet daily for injuries.     Please follow up in 4 months for weight recheck and BP recheck and A1c check

## 2025-08-04 NOTE — PROGRESS NOTES
Subjective   Gabriel Davis is a 55 y.o. male who presents for Follow-up (4 month follow up).    HPI  Previous medication list on 2/6/23: Ozeomic 0.5 mg weekly, Indomethacin 50 mg prn , amlodipine 10 mg , losartan 100 mg daily, spironolactone 25 mg daily, albuterol, night time O2  Current Meds: amlodipine 10 mg , indomethacin 50 mg , losartan 100 mg    labs due again now .    #) back abscess- resolved   - I&D on 5/6/25  - noticed it a few days ago  - thought it was bug bite     #) gout- last flare in the Fall 2023  - diuretic seemed to trigger a flare   - clavile, wrist and ankle pains   - high uric acid on 2/24 8.5   - does not want medications for this      #) T2DM - stable   - sugars have been low  - A1c 8.8% on Oct 7 2021--> 6.5% 4/11/22---> 5.7%  2/6/23 --> 6.4% --> 6.0% 12/5/23 --> 6.3% 4/1/24 --> 6.5%  3/3/25 --> 6.3%  on 8/4/25  - was put on insulin in the hospital with covid- off insulin now  - on metformin, and glipizide--> started on ozempic  - weight down 7# , and another 33# --> another 23#,, then stopped taking it and regained most of the weight back   - current weight 339  - highest wt 413# on 8/4/20 , lowest weight 308# on 8/15/22     #) HTN - UNCONTROLLED   -- home readings are around 150/80s  - 142/88 --> 132/80--> 148/80 --> 170/102--> 184/118 --> 214/118 --> 178/102 --> 155/90 --> 132/84 --> 136/86   today   - on amlodipine, losartan and then metoprolol 50 mg   - stopped the spironolactone  - made him nauseated , recently stopped the metoprolol too , just because   also on blood thinner prophylaxis, will run out in 2 weeks.      #) severe sleep apnea. - not on treatment   - referral to sleep med in the past   - rec continued home O2 at night      #) bilateral leg swelling and pain with left chronic hip pain  - needs to get the left hip xray   - was an athlete in the past   - has not done PT yet   - no imaging of the hip yet   - DVT was ruled out   - still having the left groin pain   - still very  fatigued  - this is limited ability to stay active   - needs abdominal imaging to rule out obstructive      #) obesity   - BMI 47   - wt 376 (2021)--> 309 --> 333 --> 339 --> 327 --> 349 --> 339     ROS was completed and all systems are negative with the exception of what was noted in the the HPI.     Objective     /86   Pulse 106   Wt (!) 154 kg (339 lb)   SpO2 98%   BMI 45.98 kg/m²      Last Labs:     CMP:   Lab Results   Component Value Date    CALCIUM 9.4 02/05/2024    CALCIUM 8.8 10/07/2021    CALCIUM 8.8 10/01/2021    PROT 7.2 02/05/2024    PROT 6.7 10/07/2021    PROT 6.6 10/01/2021    ALBUMIN 4.1 02/05/2024    ALBUMIN 3.6 10/07/2021    ALBUMIN 3.1 (L) 10/01/2021    AST 11 02/05/2024    AST 12 10/07/2021    AST 11 10/01/2021    ALKPHOS 85 02/05/2024    ALKPHOS 47 10/07/2021    ALKPHOS 45 10/01/2021    BILITOT 0.6 02/05/2024    BILITOT 0.9 10/07/2021    BILITOT 0.3 10/01/2021     CBC:   Lab Results   Component Value Date    WBC 6.1 02/05/2024    WBC 9.0 10/07/2021    WBC 17.6 (H) 10/01/2021    HGB 12.1 (L) 02/05/2024    HGB 10.7 (L) 10/07/2021    HGB 10.2 (L) 10/01/2021    HCT 39.4 (L) 02/05/2024    HCT 34.3 (L) 10/07/2021    HCT 32.2 (L) 10/01/2021    MCV 72 (L) 02/05/2024    MCV 74 (L) 10/07/2021    MCV 72 (L) 10/01/2021     02/05/2024     (L) 10/07/2021     10/01/2021     A1C:   Lab Results   Component Value Date    HGBA1C 6.5 03/03/2025    HGBA1C 6.3 04/01/2024     LIPID PANEL:   Lab Results   Component Value Date    CHOL 150 02/05/2024    CHOL 168 10/07/2021    CHOL 126 02/10/2020    TRIG 73 02/05/2024    TRIG 84 10/07/2021    TRIG 63 02/10/2020    HDL 38.8 02/05/2024    HDL 42.7 10/07/2021    HDL 36.4 (A) 02/10/2020    CHHDL 3.9 02/05/2024    CHHDL 3.9 10/07/2021    CHHDL 3.5 02/10/2020    LDLF 109 (H) 10/07/2021    LDLF 77 02/10/2020    VLDL 15 02/05/2024    VLDL 17 10/07/2021    VLDL 13 02/10/2020    NHDL 111 02/05/2024     TSH:   Lab Results   Component Value Date    TSH  "3.65 02/05/2024    TSH 2.75 10/07/2021    TSH 1.88 02/10/2020     PSA:   No results found for: \"PSA\"    Physical Exam  GEN: A+O, no acute distress, male with obesity  HEENT: NC/AT, Oropharynx clear, no exudates, TM visualized, Extraoccular muscles intact, no facial droop; no thyromegaly or cervical LAD  RESP: CTAB, no wheezes   CV: RRR, no murmurs  ABD: soft, non-tender, + BS  SKIN: no rashes or bruising, no peripheral edema   NEURO: CN II-XII grossly intact, moves all extremities equally, no tremor   PSYCH: normal affect, appropriate mood     Assessment/Plan   Problem List Items Addressed This Visit    None    Due for updated labs, will add on STD screening     A1c was ok at 6.4% , I would like to see if your insurance will cover the Mounaro for sleep and weight and sugar.     Continue to work on weight reduction.     Referral for PT to develop a home exercise program     Get xrays of the left hip , order in from March 2025.  Reordered      CT of the chest in 1/17/24- showed some scarring (likely from the bad covid infection), so some dilation of the arteries to the lung from untreated sleep apnea and the scarring of the lungs.     ECHO was done on 9/21/21.     Referral to a new sleep specialist for the sleep apnea last time.      Blood pressure looks better today at 132/84, would like this under 130/80.  Continue to monitor at home  Continue the amlodipine and losartan WITH the metoproolol 50 mg daily for better BP control  STOP the spironolactone due to nausea      A1c today was 6.3%, goal is to keep it under 7%.   Let try to get you on the tirzeptatide weekly   I sent in the 2.5 mg weekly dose , next month we go up to the 5 mg   Bring your blood sugar machine with your to next appt.     Refilled the vitamin D      Or work really hard on the gout diet. Handout provided last visit    please consider a podiatry consultation for diabetes. Also get a yearly dilated eye examination and monitor/check feet daily for " injuries.     Please follow up in 4 months for weight recheck and BP recheck and A1c check     Nia Noriega DO, MSMed, ABOM  [address]   [address]   [place] [address]  Ph. [phone]  Fx. [phone]

## 2025-08-05 LAB
25(OH)D3+25(OH)D2 SERPL-MCNC: 40 NG/ML (ref 30–100)
ALBUMIN SERPL-MCNC: 4.6 G/DL (ref 3.6–5.1)
ALBUMIN/CREAT UR: 10 MG/G CREAT
ALP SERPL-CCNC: 99 U/L (ref 35–144)
ALT SERPL-CCNC: 10 U/L (ref 9–46)
ANION GAP SERPL CALCULATED.4IONS-SCNC: 12 MMOL/L (CALC) (ref 7–17)
AST SERPL-CCNC: 12 U/L (ref 10–35)
BASOPHILS # BLD AUTO: 45 CELLS/UL (ref 0–200)
BASOPHILS NFR BLD AUTO: 0.5 %
BILIRUB SERPL-MCNC: 0.5 MG/DL (ref 0.2–1.2)
BUN SERPL-MCNC: 36 MG/DL (ref 7–25)
CALCIUM SERPL-MCNC: 10 MG/DL (ref 8.6–10.3)
CHLORIDE SERPL-SCNC: 104 MMOL/L (ref 98–110)
CHOLEST SERPL-MCNC: 156 MG/DL
CHOLEST/HDLC SERPL: 3.6 (CALC)
CO2 SERPL-SCNC: 25 MMOL/L (ref 20–32)
CREAT SERPL-MCNC: 2.59 MG/DL (ref 0.7–1.3)
CREAT UR-MCNC: 262 MG/DL (ref 20–320)
EGFRCR SERPLBLD CKD-EPI 2021: 28 ML/MIN/1.73M2
EOSINOPHIL # BLD AUTO: 80 CELLS/UL (ref 15–500)
EOSINOPHIL NFR BLD AUTO: 0.9 %
ERYTHROCYTE [DISTWIDTH] IN BLOOD BY AUTOMATED COUNT: 14.3 % (ref 11–15)
GLUCOSE SERPL-MCNC: 109 MG/DL (ref 65–99)
HCT VFR BLD AUTO: 41.1 % (ref 38.5–50)
HDLC SERPL-MCNC: 43 MG/DL
HGB BLD-MCNC: 12.5 G/DL (ref 13.2–17.1)
LDLC SERPL CALC-MCNC: 99 MG/DL (CALC)
LYMPHOCYTES # BLD AUTO: 1789 CELLS/UL (ref 850–3900)
LYMPHOCYTES NFR BLD AUTO: 20.1 %
MCH RBC QN AUTO: 22.7 PG (ref 27–33)
MCHC RBC AUTO-ENTMCNC: 30.4 G/DL (ref 32–36)
MCV RBC AUTO: 74.7 FL (ref 80–100)
MICROALBUMIN UR-MCNC: 2.5 MG/DL
MONOCYTES # BLD AUTO: 570 CELLS/UL (ref 200–950)
MONOCYTES NFR BLD AUTO: 6.4 %
NEUTROPHILS # BLD AUTO: 6417 CELLS/UL (ref 1500–7800)
NEUTROPHILS NFR BLD AUTO: 72.1 %
NONHDLC SERPL-MCNC: 113 MG/DL (CALC)
PLATELET # BLD AUTO: 249 THOUSAND/UL (ref 140–400)
PMV BLD REES-ECKER: 11.7 FL (ref 7.5–12.5)
POTASSIUM SERPL-SCNC: 4.6 MMOL/L (ref 3.5–5.3)
PROT SERPL-MCNC: 7.9 G/DL (ref 6.1–8.1)
PSA SERPL-MCNC: 0.45 NG/ML
RBC # BLD AUTO: 5.5 MILLION/UL (ref 4.2–5.8)
SODIUM SERPL-SCNC: 141 MMOL/L (ref 135–146)
TRIGL SERPL-MCNC: 56 MG/DL
TSH SERPL-ACNC: 1.87 MIU/L (ref 0.4–4.5)
URATE SERPL-MCNC: 10.7 MG/DL (ref 4–8)
WBC # BLD AUTO: 8.9 THOUSAND/UL (ref 3.8–10.8)

## 2025-08-14 DIAGNOSIS — R94.4 DECREASED GLOMERULAR FILTRATION RATE (GFR): Primary | ICD-10-CM

## 2025-11-03 ENCOUNTER — APPOINTMENT (OUTPATIENT)
Dept: PRIMARY CARE | Facility: CLINIC | Age: 55
End: 2025-11-03
Payer: MEDICARE

## 2025-12-08 ENCOUNTER — APPOINTMENT (OUTPATIENT)
Dept: PRIMARY CARE | Facility: CLINIC | Age: 55
End: 2025-12-08
Payer: MEDICARE